# Patient Record
Sex: FEMALE | Race: WHITE | Employment: OTHER | URBAN - METROPOLITAN AREA
[De-identification: names, ages, dates, MRNs, and addresses within clinical notes are randomized per-mention and may not be internally consistent; named-entity substitution may affect disease eponyms.]

---

## 2017-09-21 ENCOUNTER — APPOINTMENT (OUTPATIENT)
Dept: GENERAL RADIOLOGY | Age: 67
End: 2017-09-21
Attending: EMERGENCY MEDICINE
Payer: MEDICARE

## 2017-09-21 ENCOUNTER — HOSPITAL ENCOUNTER (EMERGENCY)
Age: 67
Discharge: HOME | End: 2017-09-21
Attending: EMERGENCY MEDICINE
Payer: MEDICARE

## 2017-09-21 VITALS
DIASTOLIC BLOOD PRESSURE: 74 MMHG | BODY MASS INDEX: 43.67 KG/M2 | WEIGHT: 246.47 LBS | OXYGEN SATURATION: 98 % | TEMPERATURE: 97.3 F | HEART RATE: 66 BPM | RESPIRATION RATE: 16 BRPM | HEIGHT: 63 IN | SYSTOLIC BLOOD PRESSURE: 157 MMHG

## 2017-09-21 DIAGNOSIS — M25.462 KNEE EFFUSION, LEFT: ICD-10-CM

## 2017-09-21 DIAGNOSIS — M25.562 ACUTE PAIN OF LEFT KNEE: Primary | ICD-10-CM

## 2017-09-21 DIAGNOSIS — M17.12 ARTHRITIS OF KNEE, LEFT: ICD-10-CM

## 2017-09-21 PROCEDURE — 74011250637 HC RX REV CODE- 250/637: Performed by: PHYSICIAN ASSISTANT

## 2017-09-21 PROCEDURE — 99283 EMERGENCY DEPT VISIT LOW MDM: CPT

## 2017-09-21 PROCEDURE — L1830 KO IMMOB CANVAS LONG PRE OTS: HCPCS

## 2017-09-21 PROCEDURE — 73562 X-RAY EXAM OF KNEE 3: CPT

## 2017-09-21 RX ORDER — MELOXICAM 15 MG/1
15 TABLET ORAL DAILY
Qty: 30 TAB | Refills: 0 | Status: SHIPPED | OUTPATIENT
Start: 2017-09-21

## 2017-09-21 RX ORDER — HYDROCODONE BITARTRATE AND ACETAMINOPHEN 5; 325 MG/1; MG/1
1 TABLET ORAL
Status: COMPLETED | OUTPATIENT
Start: 2017-09-21 | End: 2017-09-21

## 2017-09-21 RX ORDER — MELOXICAM 15 MG/1
15 TABLET ORAL DAILY
Qty: 30 TAB | Refills: 0 | Status: SHIPPED | OUTPATIENT
Start: 2017-09-21 | End: 2017-09-21

## 2017-09-21 RX ORDER — HYDROCODONE BITARTRATE AND ACETAMINOPHEN 5; 325 MG/1; MG/1
.5-1 TABLET ORAL
Qty: 20 TAB | Refills: 0 | Status: SHIPPED | OUTPATIENT
Start: 2017-09-21

## 2017-09-21 RX ADMIN — HYDROCODONE BITARTRATE AND ACETAMINOPHEN 1 TABLET: 5; 325 TABLET ORAL at 17:48

## 2017-09-21 NOTE — ED NOTES
Patient presents to the ED with spouse for c/o left knee pain since yesterday when she twisted state. Pain is currently a 3/10 at rest and is severe when she moves it or bears weight. No swelling or redness noted to the joint.   Per , she has been unable to walk on it today

## 2017-09-21 NOTE — DISCHARGE INSTRUCTIONS
Knee Arthritis: Care Instructions  Your Care Instructions  Knee arthritis is a breakdown of the cartilage that cushions your knee joint. When the cartilage wears down, your bones rub against each other. This causes pain and stiffness. Knee arthritis tends to get worse with time. Treatment for knee arthritis involves reducing pain, making the leg muscles stronger, and staying at a healthy body weight. The treatment usually does not improve the health of the cartilage, but it can reduce pain and improve how well your knee works. You can take simple measures to protect your knee joints, ease your pain, and help you stay active. Follow-up care is a key part of your treatment and safety. Be sure to make and go to all appointments, and call your doctor if you are having problems. It's also a good idea to know your test results and keep a list of the medicines you take. How can you care for yourself at home? · Know that knee arthritis will cause more pain on some days than on others. · Stay at a healthy weight. Lose weight if you are overweight. When you stand up, the pressure on your knees from every pound of body weight is multiplied four times. So if you lose 10 pounds, you will reduce the pressure on your knees by 40 pounds. · Talk to your doctor or physical therapist about exercises that will help ease joint pain. ¨ Stretch to help prevent stiffness and to prevent injury before you exercise. You may enjoy gentle forms of yoga to help keep your knee joints and muscles flexible. ¨ Walk instead of jog. ¨ Ride a bike. This makes your thigh muscles stronger and takes pressure off your knee. ¨ Wear well-fitting and comfortable shoes. ¨ Exercise in chest-deep water. This can help you exercise longer with less pain. ¨ Avoid exercises that include squatting or kneeling. They can put a lot of strain on your knees.   ¨ Talk to your doctor to make sure that the exercise you do is not making the arthritis worse.  · Do not sit for long periods of time. Try to walk once in a while to keep your knee from getting stiff. · Ask your doctor or physical therapist whether shoe inserts may reduce your arthritis pain. · If you can afford it, get new athletic shoes at least every year. This can help reduce the strain on your knees. · Use a device to help you do everyday activities. ¨ A cane or walking stick can help you keep your balance when you walk. Hold the cane or walking stick in the hand opposite the painful knee. ¨ If you feel like you may fall when you walk, try using crutches or a front-wheeled walker. These can prevent falls that could cause more damage to your knee. ¨ A knee brace may help keep your knee stable and prevent pain. ¨ You also can use other things to make life easier, such as a higher toilet seat and handrails in the bathtub or shower. · Take pain medicines exactly as directed. ¨ Do not wait until you are in severe pain. You will get better results if you take it sooner. ¨ If you are not taking a prescription pain medicine, take an over-the-counter medicine such as acetaminophen (Tylenol), ibuprofen (Advil, Motrin), or naproxen (Aleve). Read and follow all instructions on the label. ¨ Do not take two or more pain medicines at the same time unless the doctor told you to. Many pain medicines have acetaminophen, which is Tylenol. Too much acetaminophen (Tylenol) can be harmful. ¨ Tell your doctor if you take a blood thinner, have diabetes, or have allergies to shellfish. · Ask your doctor if you might benefit from a shot of steroid medicine into your knee. This may provide pain relief for several months. · Many people take the supplements glucosamine and chondroitin for osteoarthritis. Some people feel they help, but the medical research does not show that they work. Talk to your doctor before you take these supplements. When should you call for help?   Call your doctor now or seek immediate medical care if:  · You have sudden swelling, warmth, or pain in your knee. · You have knee pain and a fever or rash. · You have such bad pain that you cannot use your knee. Watch closely for changes in your health, and be sure to contact your doctor if you have any problems. Where can you learn more? Go to http://rosales-ofelia.info/. Enter H511 in the search box to learn more about \"Knee Arthritis: Care Instructions. \"  Current as of: October 31, 2016  Content Version: 11.3  © 8805-2406 [employer]. Care instructions adapted under license by [employer] (which disclaims liability or warranty for this information). If you have questions about a medical condition or this instruction, always ask your healthcare professional. [address] any warranty or liability for your use of this information. Knee Arthritis: Exercises  Your Care Instructions  Here are some examples of exercises for knee arthritis. Start each exercise slowly. Ease off the exercise if you start to have pain. Your doctor or physical therapist will tell you when you can start these exercises and which ones will work best for you. How to do the exercises  Knee flexion with heel slide    1. Lie on your back with your knees bent. 2. Slide your heel back by bending your affected knee as far as you can. Then hook your other foot around your ankle to help pull your heel even farther back. 3. Hold for about 6 seconds, then rest for up to 10 seconds. 4. Repeat 8 to 12 times. 5. Switch legs and repeat steps 1 through 4, even if only one knee is sore. Quad sets    1. Sit with your affected leg straight and supported on the floor or a firm bed. Place a small, rolled-up towel under your knee. Your other leg should be bent, with that foot flat on the floor. 2. Tighten the thigh muscles of your affected leg by pressing the back of your knee down into the towel.   3. Hold for about 6 seconds, then rest for up to 10 seconds. 4. Repeat 8 to 12 times. 5. Switch legs and repeat steps 1 through 4, even if only one knee is sore. Straight-leg raises to the front    1. Lie on your back with your good knee bent so that your foot rests flat on the floor. Your affected leg should be straight. Make sure that your low back has a normal curve. You should be able to slip your hand in between the floor and the small of your back, with your palm touching the floor and your back touching the back of your hand. 2. Tighten the thigh muscles in your affected leg by pressing the back of your knee flat down to the floor. Hold your knee straight. 3. Keeping the thigh muscles tight and your leg straight, lift your affected leg up so that your heel is about 12 inches off the floor. Hold for about 6 seconds, then lower slowly. 4. Relax for up to 10 seconds between repetitions. 5. Repeat 8 to 12 times. 6. Switch legs and repeat steps 1 through 5, even if only one knee is sore. Active knee flexion    1. Lie on your stomach with your knees straight. If your kneecap is uncomfortable, roll up a washcloth and put it under your leg just above your kneecap. 2. Lift the foot of your affected leg by bending the knee so that you bring the foot up toward your buttock. If this motion hurts, try it without bending your knee quite as far. This may help you avoid any painful motion. 3. Slowly move your leg up and down. 4. Repeat 8 to 12 times. 5. Switch legs and repeat steps 1 through 4, even if only one knee is sore. Quadriceps stretch (facedown)    1. Lie flat on your stomach, and rest your face on the floor. 2. Wrap a towel or belt strap around the lower part of your affected leg. Then use the towel or belt strap to slowly pull your heel toward your buttock until you feel a stretch. 3. Hold for about 15 to 30 seconds, then relax your leg against the towel or belt strap. 4. Repeat 2 to 4 times.   5. Switch legs and repeat steps 1 through 4, even if only one knee is sore. Stationary exercise bike    If you do not have a stationary exercise bike at home, you can find one to ride at your local health club or community center. 1. Adjust the height of the bike seat so that your knee is slightly bent when your leg is extended downward. If your knee hurts when the pedal reaches the top, you can raise the seat so that your knee does not bend as much. 2. Start slowly. At first, try to do 5 to 10 minutes of cycling with little to no resistance. Then increase your time and the resistance bit by bit until you can do 20 to 30 minutes without pain. 3. If you start to have pain, rest your knee until your pain gets back to the level that is normal for you. Or cycle for less time or with less effort. Follow-up care is a key part of your treatment and safety. Be sure to make and go to all appointments, and call your doctor if you are having problems. It's also a good idea to know your test results and keep a list of the medicines you take. Where can you learn more? Go to http://rosalesCompuMedofelia.info/. Enter C159 in the search box to learn more about \"Knee Arthritis: Exercises. \"  Current as of: March 21, 2017  Content Version: 11.3  © 0496-2874 [employer]. Care instructions adapted under license by [employer] (which disclaims liability or warranty for this information). If you have questions about a medical condition or this instruction, always ask your healthcare professional. [place] [address] any warranty or liability for your use of this information. Knee Pain or Injury: Care Instructions  Your Care Instructions    Injuries are a common cause of knee problems. Sudden (acute) injuries may be caused by a direct blow to the knee. They can also be caused by abnormal twisting, bending, or falling on the knee.  Pain, bruising, or swelling may be severe, and may start within minutes of the injury. Overuse is another cause of knee pain. Other causes are climbing stairs, kneeling, and other activities that use the knee. Everyday wear and tear, especially as you get older, also can cause knee pain. Rest, along with home treatment, often relieves pain and allows your knee to heal. If you have a serious knee injury, you may need tests and treatment. Follow-up care is a key part of your treatment and safety. Be sure to make and go to all appointments, and call your doctor if you are having problems. It's also a good idea to know your test results and keep a list of the medicines you take. How can you care for yourself at home? · Be safe with medicines. Read and follow all instructions on the label. ¨ If the doctor gave you a prescription medicine for pain, take it as prescribed. ¨ If you are not taking a prescription pain medicine, ask your doctor if you can take an over-the-counter medicine. · Rest and protect your knee. Take a break from any activity that may cause pain. · Put ice or a cold pack on your knee for 10 to 20 minutes at a time. Put a thin cloth between the ice and your skin. · Prop up a sore knee on a pillow when you ice it or anytime you sit or lie down for the next 3 days. Try to keep it above the level of your heart. This will help reduce swelling. · If your knee is not swollen, you can put moist heat, a heating pad, or a warm cloth on your knee. · If your doctor recommends an elastic bandage, sleeve, or other type of support for your knee, wear it as directed. · Follow your doctor's instructions about how much weight you can put on your leg. Use a cane, crutches, or a walker as instructed. · Follow your doctor's instructions about activity during your healing process. If you can do mild exercise, slowly increase your activity. · Reach and stay at a healthy weight. Extra weight can strain the joints, especially the knees and hips, and make the pain worse. Losing even a few pounds may help. When should you call for help? Call 911 anytime you think you may need emergency care. For example, call if:  · You have symptoms of a blood clot in your lung (called a pulmonary embolism). These may include:  ¨ Sudden chest pain. ¨ Trouble breathing. ¨ Coughing up blood. Call your doctor now or seek immediate medical care if:  · You have severe or increasing pain. · Your leg or foot turns cold or changes color. · You cannot stand or put weight on your knee. · Your knee looks twisted or bent out of shape. · You cannot move your knee. · You have signs of infection, such as:  ¨ Increased pain, swelling, warmth, or redness. ¨ Red streaks leading from the knee. ¨ Pus draining from a place on your knee. ¨ A fever. · You have signs of a blood clot in your leg (called a deep vein thrombosis), such as:  ¨ Pain in your calf, back of the knee, thigh, or groin. ¨ Redness and swelling in your leg or groin. Watch closely for changes in your health, and be sure to contact your doctor if:  · You have tingling, weakness, or numbness in your knee. · You have any new symptoms, such as swelling. · You have bruises from a knee injury that last longer than 2 weeks. · You do not get better as expected. Where can you learn more? Go to http://rosales-ofelia.info/. Enter K195 in the search box to learn more about \"Knee Pain or Injury: Care Instructions. \"  Current as of: March 20, 2017  Content Version: 11.3  © 4267-5665 [employer]. Care instructions adapted under license by [employer] (which disclaims liability or warranty for this information). If you have questions about a medical condition or this instruction, always ask your healthcare professional. [address] any warranty or liability for your use of this information.

## 2017-09-21 NOTE — ED PROVIDER NOTES
HPI Comments: Myrtle Kwok is a 73 y.o. female with PMhx significant for DM, HTN, hyperlipidemia, afib, depression, and obesity who presents ambulatory to the ED with cc of constant left knee pain which started today. Pt explains that the pain is sometimes sharp and sometimes achy. Her knee pain is located on the inside of her left knee. She describes that she hurt it [place] while ago\" and she twisted it today. Pt had a surgery on her left meniscus in 2009 and was going back to the surgeon today when she injured her knee. She explains that \"something snapped in it\". Pt notes that her orthopedic surgeon would not do another surgery on her after her injury. She has tried resting with no relief. Pt is otherwise without complaint. Social Hx: - Tobacco, - EtOH, - Illicit Drugs    PCP: Claudia Rosen MD    There are no other complaints, changes or physical findings at this time. The history is provided by the patient. No  was used. Past Medical History:   Diagnosis Date    Atrial fibrillation ([address]     Colon polyp     Depression     Diabetes ([address]     Neuropathy    Hyperlipemia     Hypertension     Hypothyroidism     Morbid obesity with BMI of 40.0-44.9, adult (HCC)        Past Surgical History:   Procedure Laterality Date    ABDOMEN SURGERY PROC UNLISTED      HX APPENDECTOMY      HX GYN      Hysterectomy, BTL    HX HEENT      nasal  surgery    HX ORTHOPAEDIC      neck surgery, foot surgery, knee and shoulder surgery         Family History:   Problem Relation Age of Onset    Heart Disease Mother        Social History     Social History    Marital status:      Spouse name: N/A    Number of children: N/A    Years of education: N/A     Occupational History    Not on file.      Social History Main Topics    Smoking status: Never Smoker    Smokeless tobacco: Never Used    Alcohol use No    Drug use: No    Sexual activity: Not on file     Other Topics Concern    Not on file     Social History Narrative         ALLERGIES: Tetanus vaccines and toxoid    Review of Systems   Constitutional: Negative. Negative for fever. HENT: Negative. Eyes: Negative. Respiratory: Negative. Cardiovascular: Negative. Gastrointestinal: Negative. Negative for constipation, diarrhea, nausea and vomiting. Denies liver disease   Endocrine:        Denies thyroid disease   Genitourinary: Negative. Negative for dysuria. Denies kidney disease   Musculoskeletal: Positive for arthralgias (left knee). Skin: Negative. Neurological: Negative. All other systems reviewed and are negative. Vitals:    09/21/17 1547   BP: 157/74   Pulse: 66   Resp: 16   Temp: 97.3 °F (36.3 °C)   SpO2: 98%   Weight: 111.8 kg (246 lb 7.6 oz)   Height: 5' 3\" (1.6 m)            Physical Exam   Constitutional: She is oriented to person, place, and time. She appears well-developed and well-nourished. No distress. Patient is obese. Eyes: Conjunctivae and EOM are normal. Pupils are equal, round, and reactive to light. Right eye exhibits no discharge. Left eye exhibits no discharge. No scleral icterus. Neck: Normal range of motion. Neck supple. No tracheal deviation present. Cardiovascular: Normal rate, regular rhythm, normal heart sounds and intact distal pulses. Exam reveals no gallop and no friction rub. No murmur heard. Pulmonary/Chest: Effort normal and breath sounds normal. No respiratory distress. She has no wheezes. She has no rales. She exhibits no tenderness. Musculoskeletal: She exhibits no edema. Full range of motion of left knee. Tenderness to medial joint line of left knee with no contusions or erythema. No bony tenderness to left hip. Patient is ambulatory with a limp. Lymphadenopathy:     She has no cervical adenopathy. Neurological: She is alert and oriented to person, place, and time. No cranial nerve deficit.    Neurovascularly intact distally. Skin: Skin is warm and dry. No rash noted. No erythema. Well healed laparoscopic scar on left knee. Psychiatric: She has a normal mood and affect. Her behavior is normal.   Nursing note and vitals reviewed. MDM  Number of Diagnoses or Management Options  Diagnosis management comments:   DDx: knee injury, sprain, strain, meniscus tear, arthritis       Amount and/or Complexity of Data Reviewed  Tests in the radiology section of CPT®: ordered and reviewed  Review and summarize past medical records: yes    Patient Progress  Patient progress: stable    ED Course       Procedures    Procedure Note - Splint Placement:  5:35 PM  Performed by: Viviane Hammonds  Neurovascularly intact prior to tx. A knee immobilizer was placed on pt's left knee. Joint was placed in neutral position. Neurovascularly intact after tx. She was given crutches and follow up instructions with and orthopedist.  The procedure took 1-15 minutes, and pt tolerated well. Written by Vicente Hagan, ED Scribe, as dictated by Viviane Hammonds. IMAGING RESULTS:  XR KNEE LT 3 V   Final Result   EXAM:  XR KNEE LT 3 V     INDICATION:   Knee pain.     COMPARISON: 11/25/2009.     FINDINGS: Three views of the left knee demonstrate no fracture or other acute  osseous or articular abnormality. There is a joint effusion. There is moderate  tricompartmental DJD.     IMPRESSION  IMPRESSION:  No acute abnormality. MEDICATIONS GIVEN:  Medications   HYDROcodone-acetaminophen (NORCO) 5-325 mg per tablet 1 Tab (not administered)       IMPRESSION:  1. Acute pain of left knee    2. Knee effusion, left    3. Arthritis of knee, left        PLAN:  1. Current Discharge Medication List      START taking these medications    Details   HYDROcodone-acetaminophen (NORCO) 5-325 mg per tablet Take 0.5-1 Tabs by mouth every six (6) hours as needed for Pain. Max Daily Amount: 4 Tabs.  Take in addition to Meloxicam for severe pain flares  Qty: 20 Tab, Refills: 0      meloxicam (MOBIC) 15 mg tablet Take 1 Tab by mouth daily. Take with first meal of the day. Indications: OSTEOARTHRITIS  Qty: 30 Tab, Refills: 0           2. Follow-up Information     Follow up With Details Comments MD Mikhail Loyd [address]      Erin Mention, DO Schedule an appointment as soon as possible for a visit Orthopedic specialist [facility] [phone]      [facility] EMERGENCY DEPT  If symptoms worsen [address]  [address] [facility]  [phone]        Return to ED if worse     DISCHARGE NOTE  5:35 PM  The patient has been re-evaluated and is ready for discharge. Reviewed available results with patient. Counseled patient on diagnosis and care plan. Patient has expressed understanding, and all questions have been answered. Patient agrees with plan and agrees to follow up as recommended, or return to the ED if their symptoms worsen. Discharge instructions have been provided and explained to the patient, along with reasons to return to the ED. Attestation Note:  This note is prepared by MIGUEL A [facility], acting as Scribe for Pancho Randolph: The scribe's documentation has been prepared under my direction and personally reviewed by me in its entirety. I confirm that the note above accurately reflects all work, treatment, procedures, and medical decision making performed by me.

## 2017-09-21 NOTE — ED NOTES
All discharge instructions reviewed with Ashli DYER, patient, and spouse. They deny any further questions regarding these instructions and ambulate out of ED in crutches.

## 2019-08-23 ENCOUNTER — OFFICE VISIT (OUTPATIENT)
Dept: NEUROLOGY | Age: 69
End: 2019-08-23

## 2019-08-23 VITALS
WEIGHT: 211 LBS | RESPIRATION RATE: 18 BRPM | BODY MASS INDEX: 37.38 KG/M2 | SYSTOLIC BLOOD PRESSURE: 132 MMHG | OXYGEN SATURATION: 98 % | HEART RATE: 50 BPM | DIASTOLIC BLOOD PRESSURE: 88 MMHG

## 2019-08-23 DIAGNOSIS — G30.0 EARLY ONSET ALZHEIMER'S DEMENTIA WITHOUT BEHAVIORAL DISTURBANCE (HCC): Primary | ICD-10-CM

## 2019-08-23 DIAGNOSIS — F02.80 EARLY ONSET ALZHEIMER'S DEMENTIA WITHOUT BEHAVIORAL DISTURBANCE (HCC): Primary | ICD-10-CM

## 2019-08-23 RX ORDER — MEMANTINE HYDROCHLORIDE 10 MG/1
TABLET ORAL 2 TIMES DAILY
COMMUNITY

## 2019-08-23 NOTE — PATIENT INSTRUCTIONS
[facility]   Statement to Patients  April 1, 2014      In an effort to ensure the large volume of patient prescription refills is processed in the most efficient and expeditious manner, we are asking our patients to assist us by calling your Pharmacy for all prescription refills, this will include also your  Mail Order Pharmacy. The pharmacy will contact our office electronically to continue the refill process. Please do not wait until the last minute to call your pharmacy. We need at least 48 hours (2days) to fill prescriptions. We also encourage you to call your pharmacy before going to  your prescription to make sure it is ready. With regard to controlled substance prescription refill requests (narcotic refills) that need to be picked up at our office, we ask your cooperation by providing us with at least 72 hours (3days) notice that you will need a refill. We will not refill narcotic prescription refill requests after 4:00pm on any weekday, Monday through Thursday, or after 2:00pm on Fridays, or on the weekends. We encourage everyone to explore another way of getting your prescription refill request processed using hoozin, our patient web portal through our electronic medical record system. hoozin is an efficient and effective way to communicate your medication request directly to the office and  downloadable as an susie on your smart phone . hoozin also features a review functionality that allows you to view your medication list as well as leave messages for your physician. Are you ready to get connected? If so please review the attatched instructions or speak to any of our staff to get you set up right away! Thank you so much for your cooperation. Should you have any questions please contact our Practice Administrator.     The Physicians and Staff,  [facility]

## 2019-08-23 NOTE — PROGRESS NOTES
Chief Complaint   Patient presents with    Neurologic Problem         HISTORY OF PRESENT ILLNESS  Sia Lazar is a 80 y.o. female who came in along with her , son and daughter to discuss her memory problems which she has been having for the past 6 or 7 years. It seems to be progressively getting worse. She now has difficulty doing basic activities of daily living. She has put in metallic utensils in the microwave, has gotten lost while driving, is unable to keep track of or handle money. Has very little meaningful conversations. Goes to bed at about 9 PM and wakes up around 1 PM the next day and soon after she wakes up, she wishes to go to [employer]. Needs a supervision with bathing dressing etc.  She has not been compliant with her medications and does not take them consistently. She and her  are living in an independent apartment complex.  is also dealing with his own health issues including advanced arthritis in the knees, morbid obesity       Past Medical History:   Diagnosis Date    Atrial fibrillation ([facility] [address]     Colon polyp     Depression     Diabetes ([facility]     Neuropathy    Hyperlipemia     Hypertension     Hypothyroidism     Morbid obesity with BMI of 40.0-44.9, adult (HCC)      Current Outpatient Medications   Medication Sig    memantine (NAMENDA) 10 mg tablet Take  by mouth two (2) times a day.  aspirin 81 mg chewable tablet Take 162 mg by mouth daily.  HYDROcodone-acetaminophen (NORCO) 5-325 mg per tablet Take 0.5-1 Tabs by mouth every six (6) hours as needed for Pain. Max Daily Amount: 4 Tabs. Take in addition to Meloxicam for severe pain flares    meloxicam (MOBIC) 15 mg tablet Take 1 Tab by mouth daily. Take with first meal of the day. Indications: OSTEOARTHRITIS    ergocalciferol (VITAMIN D2) 50,000 unit capsule Take 50,000 Units by mouth every seven (7) days.     diazepam (VALIUM) 5 mg tablet Take 1 tablet by mouth three (3) times daily as needed for Anxiety (spasm).  atorvastatin (LIPITOR) 40 mg tablet Take  by mouth daily.  metformin (GLUCOPHAGE) 500 mg tablet Take 1 Tab by mouth two (2) times daily (with meals). Take  by mouth two (2) times daily (with meals). Do not restart for 2 days    verapamil (CALAN) 120 mg tablet Take 1 Tab by mouth daily.  levothyroxine (SYNTHROID) 50 mcg tablet Take  by mouth daily (before breakfast). No current facility-administered medications for this visit. Allergies   Allergen Reactions    Tetanus Vaccines And Toxoid Unknown (comments)     Family History   Problem Relation Age of Onset    Heart Disease Mother      Social History     Tobacco Use    Smoking status: Never Smoker    Smokeless tobacco: Never Used   Substance Use Topics    Alcohol use: No    Drug use: No     Past Surgical History:   Procedure Laterality Date    ABDOMEN SURGERY PROC UNLISTED      HX APPENDECTOMY      HX GYN      Hysterectomy, BTL    HX HEENT      nasal  surgery    HX ORTHOPAEDIC      neck surgery, foot surgery, knee and shoulder surgery         REVIEW OF SYSTEMS  Review of Systems - History obtained from the patient  Psychological ROS: negative  ENT ROS: negative  Hematological and Lymphatic ROS: negative  Endocrine ROS: negative  Respiratory ROS: no cough, shortness of breath, or wheezing  Cardiovascular ROS: no chest pain or dyspnea on exertion  Gastrointestinal ROS: no abdominal pain, change in bowel habits, or black or bloody stools  Genito-Urinary ROS: no dysuria, trouble voiding, or hematuria  Musculoskeletal ROS: negative  Dermatological ROS: negative      PHYSICAL EXAMINATION:    Visit Vitals  /88   Pulse (!) 50   Resp 18   Wt 95.7 kg (211 lb)   SpO2 98%   BMI 37.38 kg/m²     General:  Well defined, nourished, and groomed individual in no acute distress. Neck: Supple, nontender, no bruits, no pain with resistance to active range of motion. Heart: Regular rate and rhythm, no murmurs, rub, or gallop. Normal S1S2. Lungs:  Clear to auscultation bilaterally with equal chest expansion, no cough, no wheeze  Musculoskeletal:  Extremities revealed no edema and had full range of motion of joints. Psych:  Good mood and bright affect    NEUROLOGICAL EXAMINATION:     Mental Status:   Oriented to self and family only. Could not tell me today's date, day or month. She had significant difficulty with [facility] cognitive assessment. Could not register 3 objects in her memory. Cranial Nerves:    II, III, IV, VI:  Visual acuity grossly intact. Visual fields are normal.    Pupils are equal, round, and reactive to light and accommodation. Extra-ocular movements are full and fluid. V-XII: Hearing is grossly intact. Facial features are symmetric, with normal sensation and strength. The palate rises symmetrically and the tongue protrudes midline. Sternocleidomastoids 5/5. Motor Examination: Normal tone, bulk, and strength. 5/5 muscle strength throughout. No cogwheel rigidity or clonus present. Sensory exam:  Normal throughout to pinprick, temperature, and vibration sense. Normal proprioception. Coordination:  Finger to nose and rapid arm movement testing was normal.   No resting or intention tremor    Gait and Station:  Steady. Normal arm swing. No Rhomberg or pronator drift. No muscle wasting or fasiculations noted. Reflexes:  DTRs 2+ throughout. Toes downgoing.         LABS / IMAGING  Lab Results   Component Value Date/Time    WBC 6.1 04/01/2015 09:34 AM    HGB 12.5 04/01/2015 09:34 AM    HCT 39.3 04/01/2015 09:34 AM    PLATELET 032 49/59/1746 09:34 AM    MCV 91.6 04/01/2015 09:34 AM     Lab Results   Component Value Date/Time    Sodium 140 04/01/2015 03:40 AM    Potassium 3.4 (L) 04/01/2015 03:40 AM    Chloride 105 04/01/2015 03:40 AM    CO2 26 04/01/2015 03:40 AM    Anion gap 9 04/01/2015 03:40 AM    Glucose 135 (H) 04/01/2015 03:40 AM    BUN 5 (L) 04/01/2015 03:40 AM    Creatinine 0.57 04/01/2015 03:40 AM    BUN/Creatinine ratio 9 (L) 04/01/2015 03:40 AM    GFR est AA >60 04/01/2015 03:40 AM    GFR est non-AA >60 04/01/2015 03:40 AM    Calcium 9.1 04/01/2015 03:40 AM    Bilirubin, total 0.4 04/01/2015 03:40 AM    AST (SGOT) 26 04/01/2015 03:40 AM    Alk. phosphatase 60 04/01/2015 03:40 AM    Protein, total 6.5 04/01/2015 03:40 AM    Albumin 3.2 (L) 04/01/2015 03:40 AM    Globulin 3.3 04/01/2015 03:40 AM    A-G Ratio 1.0 (L) 04/01/2015 03:40 AM    ALT (SGPT) 22 04/01/2015 03:40 AM     Lab Results   Component Value Date/Time    TSH 2.04 03/28/2015 02:00 PM     Lab Results   Component Value Date/Time    Vitamin B12 415 03/28/2015 02:00 PM       ASSESSMENT    ICD-10-CM ICD-9-CM    1. Early onset Alzheimer's dementia without behavioral disturbance G30.0 331.0 MRI BRAIN WO CONT    F02.80 294.10        DISCUSSION  Ms. Antonia Anderson has advanced dementia of Alzheimer's type, likely due to Alzheimer's disease. We discussed that she is incapable of being alone for any amount of time. Fortunately she has a very supportive  and she is under supervision 24/7.   She should not drive  We also discussed that her needs are likely going to increase further with time and it is better for them to move into an assisted living facility as  has his own medical issues  Will arrange for a appointment with Alzheimer's counselor  We discussed pharmacologic treatment options but I am deferring those now as compliance with medications has been a big issue  We will check MRI scan of the brain to rule out any other structural causes and to assess for extent of white matter disease  Different methods to keep her engaged mentally were reviewed but it appears difficult with her current living situation  We will continue to follow clinically    Raz Ahn MD  Diplomate, [address] [place] [address] Rd., [address] of Psychiatry & Neurology (Neurology)  Diplomate, [address] [place] [address] Rd., [address] of Psychiatry & Neurology (Clinical Neurophysiology)  Diplomate, American Board of Electrodiagnostic Medicine    This note will not be viewable in [address]

## 2019-08-26 ENCOUNTER — TELEPHONE (OUTPATIENT)
Dept: NEUROLOGY | Age: 69
End: 2019-08-26

## 2019-08-26 NOTE — TELEPHONE ENCOUNTER
Betty w/ [facility] scheduling talked to pt's  about schedule MRI and he said Dr. Ruth Campbell said the pt could have it done in [facility]. She is calling to get clarification. Steph Hardy stated the pt's  said it was not [place].  Please call back

## 2019-08-27 NOTE — TELEPHONE ENCOUNTER
Spoke with , he states he doesn't mind driving to [place] to have imaging done. It's not that far. Left message for [facility] with scheduling.

## 2019-08-28 ENCOUNTER — TELEPHONE (OUTPATIENT)
Dept: NEUROLOGY | Age: 69
End: 2019-08-28

## 2019-08-28 NOTE — TELEPHONE ENCOUNTER
Patient has no heard anything about scheduling MRI. Patient would like a call back from nurse. Please advise.       Best # [phone]

## 2019-08-30 ENCOUNTER — TELEPHONE (OUTPATIENT)
Dept: NEUROLOGY | Age: 69
End: 2019-08-30

## 2019-08-30 NOTE — TELEPHONE ENCOUNTER
----- Message from Peterson Rand sent at 8/30/2019  1:28 PM EDT -----  Regarding: Dr. Jayashree Redding first and last name: Kyaw Deng      Reason for call: requesting a call back in regards to test results      Callback required yes/no and why: yes      Best contact number(s): [phone]      Details to clarify the request:       Peterson Rand

## 2020-01-10 ENCOUNTER — OFFICE VISIT (OUTPATIENT)
Dept: NEUROLOGY | Age: 70
End: 2020-01-10

## 2020-01-10 VITALS
DIASTOLIC BLOOD PRESSURE: 72 MMHG | OXYGEN SATURATION: 98 % | SYSTOLIC BLOOD PRESSURE: 140 MMHG | HEART RATE: 64 BPM | HEIGHT: 63 IN | WEIGHT: 231.4 LBS | BODY MASS INDEX: 41 KG/M2 | RESPIRATION RATE: 16 BRPM

## 2020-01-10 DIAGNOSIS — G30.0 EARLY ONSET ALZHEIMER'S DEMENTIA WITHOUT BEHAVIORAL DISTURBANCE (HCC): Primary | ICD-10-CM

## 2020-01-10 DIAGNOSIS — F02.80 EARLY ONSET ALZHEIMER'S DEMENTIA WITHOUT BEHAVIORAL DISTURBANCE (HCC): Primary | ICD-10-CM

## 2020-01-10 RX ORDER — ROSUVASTATIN CALCIUM 20 MG/1
20 TABLET, COATED ORAL
COMMUNITY

## 2020-01-10 RX ORDER — ESCITALOPRAM OXALATE 10 MG/1
10 TABLET ORAL DAILY
COMMUNITY

## 2020-01-10 RX ORDER — LORAZEPAM 1 MG/1
TABLET ORAL
COMMUNITY
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
## 2020-01-10 NOTE — PATIENT INSTRUCTIONS
[facility]   Statement to Patients  April 1, 2014      In an effort to ensure the large volume of patient prescription refills is processed in the most efficient and expeditious manner, we are asking our patients to assist us by calling your Pharmacy for all prescription refills, this will include also your  Mail Order Pharmacy. The pharmacy will contact our office electronically to continue the refill process. Please do not wait until the last minute to call your pharmacy. We need at least 48 hours (2days) to fill prescriptions. We also encourage you to call your pharmacy before going to  your prescription to make sure it is ready. With regard to controlled substance prescription refill requests (narcotic refills) that need to be picked up at our office, we ask your cooperation by providing us with at least 72 hours (3days) notice that you will need a refill. We will not refill narcotic prescription refill requests after 4:00pm on any weekday, Monday through Thursday, or after 2:00pm on Fridays, or on the weekends. We encourage everyone to explore another way of getting your prescription refill request processed using [employer], our patient web portal through our electronic medical record system. [employer] is an efficient and effective way to communicate your medication request directly to the office and  downloadable as an susie on your smart phone . [employer] also features a review functionality that allows you to view your medication list as well as leave messages for your physician. Are you ready to get connected? If so please review the attatched instructions or speak to any of our staff to get you set up right away! Thank you so much for your cooperation. Should you have any questions please contact our Practice Administrator.     The Physicians and Staff,  [facility]

## 2020-06-08 ENCOUNTER — HOSPITAL ENCOUNTER (OUTPATIENT)
Dept: REHABILITATION | Age: 70
End: 2020-06-23
Attending: PHYSICAL MEDICINE & REHABILITATION | Admitting: PHYSICAL MEDICINE & REHABILITATION

## 2020-06-08 DIAGNOSIS — S06.9X0A UNSPECIFIED INTRACRANIAL INJURY WITHOUT LOSS OF CONSCIOUSNESS, INITIAL ENCOUNTER (HCC): ICD-10-CM

## 2020-06-09 LAB
25(OH)D3 SERPL-MCNC: 36.3 NG/ML (ref 30–100)
ALBUMIN SERPL-MCNC: 3.7 G/DL (ref 3.5–5)
ALBUMIN/GLOB SERPL: 0.9 {RATIO} (ref 1.1–2.2)
ALP SERPL-CCNC: 113 U/L (ref 45–117)
ALT SERPL-CCNC: 16 U/L (ref 12–78)
ANION GAP SERPL CALC-SCNC: 9 MMOL/L (ref 5–15)
APPEARANCE UR: ABNORMAL
AST SERPL-CCNC: 17 U/L (ref 15–37)
BACTERIA URNS QL MICRO: ABNORMAL /HPF
BILIRUB SERPL-MCNC: 1.3 MG/DL (ref 0.2–1)
BILIRUB UR QL: NEGATIVE
BUN SERPL-MCNC: 12 MG/DL (ref 6–20)
BUN/CREAT SERPL: 20 (ref 12–20)
CALCIUM SERPL-MCNC: 9.7 MG/DL (ref 8.5–10.1)
CHLORIDE SERPL-SCNC: 102 MMOL/L (ref 97–108)
CO2 SERPL-SCNC: 24 MMOL/L (ref 21–32)
COLOR UR: ABNORMAL
CREAT SERPL-MCNC: 0.61 MG/DL (ref 0.55–1.02)
EPITH CASTS URNS QL MICRO: ABNORMAL /LPF
ERYTHROCYTE [DISTWIDTH] IN BLOOD BY AUTOMATED COUNT: 14.3 % (ref 11.5–14.5)
GLOBULIN SER CALC-MCNC: 4 G/DL (ref 2–4)
GLUCOSE SERPL-MCNC: 135 MG/DL (ref 65–100)
GLUCOSE UR STRIP.AUTO-MCNC: NEGATIVE MG/DL
HCT VFR BLD AUTO: 42.8 % (ref 35–47)
HGB BLD-MCNC: 13.8 G/DL (ref 11.5–16)
HGB UR QL STRIP: NEGATIVE
KETONES UR QL STRIP.AUTO: 15 MG/DL
LEUKOCYTE ESTERASE UR QL STRIP.AUTO: ABNORMAL
MAGNESIUM SERPL-MCNC: 2.3 MG/DL (ref 1.6–2.4)
MCH RBC QN AUTO: 31.3 PG (ref 26–34)
MCHC RBC AUTO-ENTMCNC: 32.2 G/DL (ref 30–36.5)
MCV RBC AUTO: 97.1 FL (ref 80–99)
MUCOUS THREADS URNS QL MICRO: ABNORMAL /LPF
NITRITE UR QL STRIP.AUTO: POSITIVE
NRBC # BLD: 0 K/UL (ref 0–0.01)
NRBC BLD-RTO: 0 PER 100 WBC
PH UR STRIP: 6 [PH] (ref 5–8)
PLATELET # BLD AUTO: 199 K/UL (ref 150–400)
PMV BLD AUTO: 10.5 FL (ref 8.9–12.9)
POTASSIUM SERPL-SCNC: 3.3 MMOL/L (ref 3.5–5.1)
PROT SERPL-MCNC: 7.7 G/DL (ref 6.4–8.2)
PROT UR STRIP-MCNC: ABNORMAL MG/DL
RBC # BLD AUTO: 4.41 M/UL (ref 3.8–5.2)
RBC #/AREA URNS HPF: ABNORMAL /HPF (ref 0–5)
SODIUM SERPL-SCNC: 135 MMOL/L (ref 136–145)
SP GR UR REFRACTOMETRY: 1.03 (ref 1–1.03)
UA: UC IF INDICATED,UAUC: ABNORMAL
UROBILINOGEN UR QL STRIP.AUTO: 1 EU/DL (ref 0.2–1)
WBC # BLD AUTO: 6.1 K/UL (ref 3.6–11)
WBC URNS QL MICRO: ABNORMAL /HPF (ref 0–4)

## 2020-06-09 PROCEDURE — 87077 CULTURE AEROBIC IDENTIFY: CPT

## 2020-06-09 PROCEDURE — 36415 COLL VENOUS BLD VENIPUNCTURE: CPT

## 2020-06-09 PROCEDURE — 82306 VITAMIN D 25 HYDROXY: CPT

## 2020-06-09 PROCEDURE — 85027 COMPLETE CBC AUTOMATED: CPT

## 2020-06-09 PROCEDURE — 80053 COMPREHEN METABOLIC PANEL: CPT

## 2020-06-09 PROCEDURE — 83735 ASSAY OF MAGNESIUM: CPT

## 2020-06-09 PROCEDURE — 87186 SC STD MICRODIL/AGAR DIL: CPT

## 2020-06-09 PROCEDURE — 81001 URINALYSIS AUTO W/SCOPE: CPT

## 2020-06-09 PROCEDURE — 87086 URINE CULTURE/COLONY COUNT: CPT

## 2020-06-11 LAB
BACTERIA SPEC CULT: ABNORMAL
CC UR VC: ABNORMAL
SERVICE CMNT-IMP: ABNORMAL

## 2020-06-14 LAB
ANION GAP SERPL CALC-SCNC: 5 MMOL/L (ref 5–15)
BUN SERPL-MCNC: 10 MG/DL (ref 6–20)
BUN/CREAT SERPL: 18 (ref 12–20)
CALCIUM SERPL-MCNC: 8.9 MG/DL (ref 8.5–10.1)
CHLORIDE SERPL-SCNC: 103 MMOL/L (ref 97–108)
CO2 SERPL-SCNC: 28 MMOL/L (ref 21–32)
CREAT SERPL-MCNC: 0.56 MG/DL (ref 0.55–1.02)
GLUCOSE SERPL-MCNC: 112 MG/DL (ref 65–100)
POTASSIUM SERPL-SCNC: 3.6 MMOL/L (ref 3.5–5.1)
SODIUM SERPL-SCNC: 136 MMOL/L (ref 136–145)

## 2020-06-14 PROCEDURE — 36415 COLL VENOUS BLD VENIPUNCTURE: CPT

## 2020-06-14 PROCEDURE — 80048 BASIC METABOLIC PNL TOTAL CA: CPT

## 2020-06-18 PROCEDURE — 87635 SARS-COV-2 COVID-19 AMP PRB: CPT

## 2020-06-19 LAB
SARS-COV-2, COV2: NOT DETECTED
SOURCE, COVRS: NORMAL
SPECIMEN SOURCE, FCOV2M: NORMAL

## 2020-06-21 LAB
ANION GAP SERPL CALC-SCNC: 5 MMOL/L (ref 5–15)
BUN SERPL-MCNC: 7 MG/DL (ref 6–20)
BUN/CREAT SERPL: 10 (ref 12–20)
CALCIUM SERPL-MCNC: 8.7 MG/DL (ref 8.5–10.1)
CHLORIDE SERPL-SCNC: 105 MMOL/L (ref 97–108)
CO2 SERPL-SCNC: 31 MMOL/L (ref 21–32)
CREAT SERPL-MCNC: 0.71 MG/DL (ref 0.55–1.02)
GLUCOSE SERPL-MCNC: 94 MG/DL (ref 65–100)
POTASSIUM SERPL-SCNC: 3.7 MMOL/L (ref 3.5–5.1)
SODIUM SERPL-SCNC: 141 MMOL/L (ref 136–145)

## 2020-06-21 PROCEDURE — 80048 BASIC METABOLIC PNL TOTAL CA: CPT

## 2020-06-21 PROCEDURE — 36415 COLL VENOUS BLD VENIPUNCTURE: CPT

## 2022-02-19 ENCOUNTER — HOSPITAL ENCOUNTER (EMERGENCY)
Age: 72
Discharge: HOME | End: 2022-02-19
Attending: EMERGENCY MEDICINE
Payer: MEDICARE

## 2022-02-19 ENCOUNTER — APPOINTMENT (OUTPATIENT)
Dept: CT IMAGING | Age: 72
End: 2022-02-19
Attending: EMERGENCY MEDICINE
Payer: MEDICARE

## 2022-02-19 VITALS
RESPIRATION RATE: 16 BRPM | SYSTOLIC BLOOD PRESSURE: 125 MMHG | HEIGHT: 63 IN | HEART RATE: 53 BPM | TEMPERATURE: 98.3 F | OXYGEN SATURATION: 97 % | WEIGHT: 230 LBS | BODY MASS INDEX: 40.75 KG/M2 | DIASTOLIC BLOOD PRESSURE: 82 MMHG
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
## 2022-02-19 DIAGNOSIS — S20.229A CONTUSION OF THORACIC SPINE: ICD-10-CM

## 2022-02-19 PROCEDURE — 72128 CT CHEST SPINE W/O DYE: CPT

## 2022-02-19 PROCEDURE — 74011250637 HC RX REV CODE- 250/637: Performed by: EMERGENCY MEDICINE

## 2022-02-19 PROCEDURE — 72125 CT NECK SPINE W/O DYE: CPT

## 2022-02-19 PROCEDURE — 99284 EMERGENCY DEPT VISIT MOD MDM: CPT

## 2022-02-19 PROCEDURE — 70450 CT HEAD/BRAIN W/O DYE: CPT

## 2022-02-19 RX ADMIN — ACETAMINOPHEN ORAL SOLUTION 1000 MG: 160 SOLUTION ORAL at 10:34

## 2022-02-19 NOTE — ED NOTES
Spoke with Pts. Son and daughter in law and they said pt. Will need transport back to facility due to [place] restrictions at [facility]. Daughter in laws number is [phone]. Nursing supervisor aware that pt. Needs transport back to facility.

## 2022-02-19 NOTE — ED PROVIDER NOTES
EMERGENCY DEPARTMENT HISTORY AND PHYSICAL EXAM      Date: 2/19/2022  Patient Name: Veena Son    History of Presenting Illness     Chief Complaint   Patient presents with    Fall       History Provided By: Patient, EMS and Nursing Home/SNF/Rehab Center    HPI: Veena Son, 80 y.o. female with PMHx significant for DM, hypertension, hypothyroid, presents via EMS to the ED with cc of fall. Patient is a resident at [facility]. Patient had a unwitnessed fall around 645 this morning. She was found by staff at 740. Hematoma was noted to her right forehead. Unknown loss of consciousness. Patient was complaining of some neck and mid back pain. Baseline mental status per facility. He denies any pain in her arms or legs. No chest pain or shortness of breath. No abdominal pain nausea vomiting or diarrhea. No vision changes. Is not on any anticoagulants other than daily baby aspirin. PMHx: Significant for DM, hypertension, hypothyroid  PSHx: Significant for appendectomy. Social Hx: Non-smoker. There are no other complaints, changes, or physical findings at this time. PCP: Gracie Almodovar MD    No current facility-administered medications on file prior to encounter. Current Outpatient Medications on File Prior to Encounter   Medication Sig Dispense Refill    escitalopram oxalate (LEXAPRO) 10 mg tablet Take 10 mg by mouth daily.  LORazepam (ATIVAN) 1 mg tablet Take  by mouth every four (4) hours as needed for Anxiety.  rosuvastatin (CRESTOR) 20 mg tablet Take 20 mg by mouth nightly.  memantine (NAMENDA) 10 mg tablet Take  by mouth two (2) times a day.  HYDROcodone-acetaminophen (NORCO) 5-325 mg per tablet Take 0.5-1 Tabs by mouth every six (6) hours as needed for Pain. Max Daily Amount: 4 Tabs. Take in addition to Meloxicam for severe pain flares 20 Tab 0    meloxicam (MOBIC) 15 mg tablet Take 1 Tab by mouth daily. Take with first meal of the day. Indications: OSTEOARTHRITIS 30 Tab 0    ergocalciferol (VITAMIN D2) 50,000 unit capsule Take 50,000 Units by mouth every seven (7) days.  diazepam (VALIUM) 5 mg tablet Take 1 tablet by mouth three (3) times daily as needed for Anxiety (spasm). 20 tablet 0    atorvastatin (LIPITOR) 40 mg tablet Take  by mouth daily.  metformin (GLUCOPHAGE) 500 mg tablet Take 1 Tab by mouth two (2) times daily (with meals). Take  by mouth two (2) times daily (with meals). Do not restart for 2 days 30 Tab 0    aspirin 81 mg chewable tablet Take 162 mg by mouth daily.  verapamil (CALAN) 120 mg tablet Take 1 Tab by mouth daily. 90 Tab 0    levothyroxine (SYNTHROID) 50 mcg tablet Take  by mouth daily (before breakfast). Past History     Past Medical History:  Past Medical History:   Diagnosis Date    Atrial fibrillation [facility] [address]     Colon polyp     Depression     Diabetes ([facility] [address]     Neuropathy    Hyperlipemia     Hypertension     Hypothyroidism     Morbid obesity with BMI of 40.0-44.9, adult ([facility])        Past Surgical History:  Past Surgical History:   Procedure Laterality Date    HX APPENDECTOMY      HX GYN      Hysterectomy, BTL    HX HEENT      nasal  surgery    HX ORTHOPAEDIC      neck surgery, foot surgery, knee and shoulder surgery    [place] ABDOMEN SURGERY PROC UNLISTED         Family History:  Family History   Problem Relation Age of Onset    Heart Disease Mother        Social History:  Social History     Tobacco Use    Smoking status: Never Smoker    Smokeless tobacco: Never Used   Substance Use Topics    Alcohol use: No    Drug use: No       Allergies: Allergies   Allergen Reactions    Tetanus Vaccines And Toxoid Unknown (comments)         Review of Systems   Review of Systems   Constitutional: Negative for activity change, chills and fever. HENT: Negative for congestion and sore throat. Eyes: Negative for pain and redness.    Respiratory: Negative for cough, chest tightness and shortness of breath. Cardiovascular: Negative for chest pain and palpitations. Gastrointestinal: Negative for abdominal pain, diarrhea, nausea and vomiting. Genitourinary: Negative for dysuria, frequency and urgency. Musculoskeletal: Positive for back pain and neck pain. Skin: Negative for rash. Neurological: Positive for headaches. Negative for syncope and light-headedness. Psychiatric/Behavioral: Negative for confusion. All other systems reviewed and are negative. Physical Exam   Physical Exam  Vitals and nursing note reviewed. Constitutional:       General: She is not in acute distress. Appearance: She is well-developed. She is not diaphoretic. HENT:      Head: Normocephalic. Comments: Small hematoma right forehead. Skin intact. Nose: Nose normal.      Mouth/Throat:      Pharynx: No oropharyngeal exudate. Eyes:      General: No scleral icterus. Conjunctiva/sclera: Conjunctivae normal.      Pupils: Pupils are equal, round, and reactive to light. Neck:      Thyroid: No thyromegaly. Vascular: No JVD. Trachea: No tracheal deviation. Comments: In c-collar PTA. No midline vertebral point tenderness. No step-offs. Cardiovascular:      Rate and Rhythm: Normal rate and regular rhythm. Heart sounds: No murmur heard. No friction rub. No gallop. Pulmonary:      Effort: Pulmonary effort is normal. No respiratory distress. Breath sounds: Normal breath sounds. No stridor. No wheezing or rales. Abdominal:      General: Bowel sounds are normal. There is no distension. Palpations: Abdomen is soft. Tenderness: There is no abdominal tenderness. There is no guarding or rebound. Musculoskeletal:         General: Normal range of motion. Cervical back: Normal range of motion and neck supple. Comments: He was all extremities with 5 out of 5 strength and no pain. Back: No upper or mid thoracic midline vertebral point tenderness. There is mild tenderness of her lower midline thoracic vertebral area. No step-offs. Skin intact. No midline lumbar vertebral point tenderness or step-offs. Toes up/down. Lymphadenopathy:      Cervical: No cervical adenopathy. Skin:     General: Skin is warm and dry. Findings: No erythema or rash. Neurological:      Mental Status: She is alert and oriented to person, place, and time. Cranial Nerves: No cranial nerve deficit. Motor: No abnormal muscle tone. Coordination: Coordination normal.   Psychiatric:         Behavior: Behavior normal.             Diagnostic Study Results     Labs -   No results found for this or any previous visit (from the past 12 hour(s)). Radiologic Studies -   CT HEAD WO CONT   Final Result   No acute intracranial abnormality. Right frontal scalp hematoma            CT SPINE [facility] WO CONT   Final Result   No evidence of acute fracture. CT SPINE [facility] WO CONT   Final Result      No evidence of fracture        CT Results  (Last 48 hours)               02/19/22 0909  CT HEAD WO CONT Final result    Impression:  No acute intracranial abnormality. Right frontal scalp hematoma               Narrative:  EXAM: CT HEAD WO CONT       INDICATION: fall       COMPARISON: None. CONTRAST: None. TECHNIQUE: Unenhanced CT of the head was performed using 5 mm images. Brain and   bone windows were generated. Coronal and sagittal reformats. CT dose reduction   was achieved through use of a standardized protocol tailored for this   examination and automatic exposure control for dose modulation. FINDINGS:   The ventricles and sulci are normal in size, shape and configuration. . There is   no significant white matter disease. There is no intracranial hemorrhage,   extra-axial collection, or mass effect. The basilar cisterns are open. No CT   evidence of acute infarct. The bone windows demonstrate no abnormalities.  The visualized portions of the paranasal sinuses and mastoid air cells are clear. Right frontal scalp hematoma. 02/19/22 0909  CT SPINE CERV WO CONT Final result    Impression:  No evidence of acute fracture. Narrative:  EXAM:  CT CERVICAL SPINE WITHOUT CONTRAST       INDICATION: fall. COMPARISON: None. CONTRAST:  None. TECHNIQUE: Multislice helical CT of the cervical spine was performed without   intravenous contrast administration. Sagittal and coronal reformats were   generated. CT dose reduction was achieved through use of a standardized   protocol tailored for this examination and automatic exposure control for dose   modulation. FINDINGS:       The alignment is within normal limits. There is no fracture or compression   deformity. The odontoid process is intact. The craniocervical junction is within   normal limits. The incidentally imaged soft tissues are within normal limits. Multilevel   endplate degenerative changes are noted throughout cervical spine with facet   arthrosis which is most pronounced at C4-5 causing severe right foraminal   narrowing.            02/19/22 0909  CT SPINE THORAC WO CONT Final result    Impression:      No evidence of fracture       Narrative:  INDICATION: Trauma       COMPARISON: No comparisons. TECHNIQUE:   Noncontrast axial CT imaging of the thoracic spine was performed. Coronal and sagittal reconstructions were obtained. CT dose reduction was achieved through use of a standardized protocol tailored   for this examination and automatic exposure control for dose modulation. FINDINGS:       There is no evidence of fracture or subluxation. Multilevel endplate   degenerative changes are noted. Incidental soft tissue imaging demonstrates   cardiomegaly. Atelectasis in the lung bases. Chronic right-sided rib fractures.                CXR Results  (Last 48 hours)    None            Medical Decision Making   I am the first provider for this patient. I reviewed the vital signs, available nursing notes, past medical history, past surgical history, family history and social history. Vital Signs-Reviewed the patient's vital signs. Patient Vitals for the past 12 hrs:   Temp Pulse Resp BP SpO2   02/19/22 0835 98.3 °F (36.8 °C) (!) 54 16 (!) 142/74 96 %           Records Reviewed: Nursing notes reviewed    Provider Notes (Medical Decision Making):   DDX: Contusion, fracture, ICH. ED Course:   Initial assessment performed. The patients presenting problems have been discussed, and they are in agreement with the care plan formulated and outlined with them. I have encouraged them to ask questions as they arise throughout their visit. PROGRESS NOTE    Pt reevaluated. D head, C-spine and T-spine without acute findings. Reassured patient and son. Will discharge back to assisted living facility. No evidence of significant injury. Written by Jose F Hou MD     Progress note:    Pt noted to be feeling better and ready for discharge. Updated pt and/or family on all final lab and/or  imaging findings. Will follow up as instructed. All questions have been answered, pt voiced understanding and agreement with plan. Specific return precautions provided as well as instructions to return to the ED should sx worsen at any time. Vital signs stable for discharge. I have also put together some discharge instructions for them that include: 1) educational information regarding their diagnosis, 2) how to care for their diagnosis at home, as well a 3) list of reasons why they would want to return to the ED prior to their follow-up appointment, should their condition change. Written by Jose F Hou MD        Critical Care Time:   0    Disposition:  Discharge    PLAN:  1. Current Discharge Medication List        2.    Follow-up Information     Follow up With Specialties Details Why Contact Info    Zeenat Lees MD Family Medicine Schedule an appointment as soon as possible for a visit in 1 week If symptoms worsen [address]      [facility] Go in 1 day If symptoms worsen [address] [address]  [phone]        Return to ED if worse     Diagnosis     Clinical Impression:   1. Fall, initial encounter    2. Contusion of thoracic spine              Please note that this dictation was completed with [employer], the computer voice recognition software. Quite often unanticipated grammatical, syntax, homophones, and other interpretive errors are inadvertently transcribed by the computer software. Please disregard these errors. Please excuse any errors that have escaped final proofreading.

## 2022-02-19 NOTE — ED NOTES
TRANSFER - OUT REPORT:    Verbal report given to MARIA DE JESUS Burks (name) on Gina Sen  being transferred to [facility](unit) for routine progression of care       Report consisted of patients Situation, Background, Assessment and   Recommendations(SBAR). Information from the following report(s) SBAR, Kardex, ED Summary and MAR was reviewed with the receiving nurse. Lines:       Opportunity for questions and clarification was provided.       Patient transported with:   [employer]

## 2022-02-19 NOTE — ED NOTES
Unwitnessed fall between 0645 and 0745 this morning, found on ground with right forehead heamtoma, unknown LOC, baseline mental status when found . LTC staff placed pt in bed , rescue arrived and c - collared and back boarded pt for transport.  Alert, eyes closed and verbal on arrival

## 2022-03-14 ENCOUNTER — APPOINTMENT (OUTPATIENT)
Dept: CT IMAGING | Age: 72
End: 2022-03-14
Attending: EMERGENCY MEDICINE
Payer: MEDICARE

## 2022-03-14 ENCOUNTER — HOSPITAL ENCOUNTER (EMERGENCY)
Age: 72
Discharge: HOME | End: 2022-03-14
Attending: EMERGENCY MEDICINE
Payer: MEDICARE

## 2022-03-14 VITALS
RESPIRATION RATE: 16 BRPM | OXYGEN SATURATION: 97 % | TEMPERATURE: 97.9 F | HEIGHT: 63 IN | DIASTOLIC BLOOD PRESSURE: 72 MMHG | WEIGHT: 230 LBS | SYSTOLIC BLOOD PRESSURE: 148 MMHG | BODY MASS INDEX: 40.75 KG/M2 | HEART RATE: 56 BPM
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
## 2022-03-14 LAB
GLUCOSE BLD STRIP.AUTO-MCNC: 88 MG/DL (ref 65–117)
SERVICE CMNT-IMP: NORMAL

## 2022-03-14 PROCEDURE — 82962 GLUCOSE BLOOD TEST: CPT

## 2022-03-14 PROCEDURE — 72125 CT NECK SPINE W/O DYE: CPT

## 2022-03-14 PROCEDURE — 70450 CT HEAD/BRAIN W/O DYE: CPT

## 2022-03-14 PROCEDURE — 99284 EMERGENCY DEPT VISIT MOD MDM: CPT

## 2022-03-14 NOTE — ED TRIAGE NOTES
Pt arrived by EMS from [facility] after a fall. Per staff pt was found laying on the bathroom floor, per staff pt was on the floor for approx 10 minutes. EMS reports no obvious injury and pt offered no complaints. Pt arrived somnolent and is able to give her name.   Pt educated on ER flow

## 2022-03-14 NOTE — ED PROVIDER NOTES
EMERGENCY DEPARTMENT HISTORY AND PHYSICAL EXAM          Date: 3/14/2022  Patient Name: Larue Duane    History of Presenting Illness     Chief Complaint   Patient presents with    Fall       History Provided By: Patient and Nursing Home/SNF/Rehab Center    HPI: Larue Duane is a 80 y.o. female, pmhx listed below, who presents to the ED c/o fall. According to [facility] living facility, patient had a mechanical fall in the bathroom prior to arrival.  Was seen in our emergency department for a head injury 2 weeks ago and at that time had swelling to right forehead, today [facility] reports they did not notice any injuries. Patient is on the memory care unit at [facility]. Unable to provide a history on her own. Denies any pain at this time. No loss of consciousness witnessed. Arrived by EMS, patient was able to sit up and transfer from wheelchair to stretcher for EMS. PCP: Aster Gonzalez, NP    There are no other complaints, changes, or physical findings at this time.          Past History       Past Medical History:  Past Medical History:   Diagnosis Date    Atrial fibrillation ([address]     Colon polyp     Depression     Diabetes ([facility] [address]     Neuropathy    Hyperlipemia     Hypertension     Hypothyroidism     Morbid obesity with BMI of 40.0-44.9, adult ([facility])        Past Surgical History:  Past Surgical History:   Procedure Laterality Date    HX APPENDECTOMY      HX GYN      Hysterectomy, BTL    HX HEENT      nasal  surgery    HX ORTHOPAEDIC      neck surgery, foot surgery, knee and shoulder surgery    [place] ABDOMEN SURGERY PROC UNLISTED         Family History:  Family History   Problem Relation Age of Onset    Heart Disease Mother        Social History:  Social History     Tobacco Use    Smoking status: Never Smoker    Smokeless tobacco: Never Used   Substance Use Topics    Alcohol use: No    Drug use: No       Current Outpatient Medications   Medication Sig Dispense Refill  escitalopram oxalate (LEXAPRO) 10 mg tablet Take 10 mg by mouth daily.  LORazepam (ATIVAN) 1 mg tablet Take  by mouth every four (4) hours as needed for Anxiety.  rosuvastatin (CRESTOR) 20 mg tablet Take 20 mg by mouth nightly.  memantine (NAMENDA) 10 mg tablet Take  by mouth two (2) times a day.  HYDROcodone-acetaminophen (NORCO) 5-325 mg per tablet Take 0.5-1 Tabs by mouth every six (6) hours as needed for Pain. Max Daily Amount: 4 Tabs. Take in addition to Meloxicam for severe pain flares 20 Tab 0    meloxicam (MOBIC) 15 mg tablet Take 1 Tab by mouth daily. Take with first meal of the day. Indications: OSTEOARTHRITIS 30 Tab 0    ergocalciferol (VITAMIN D2) 50,000 unit capsule Take 50,000 Units by mouth every seven (7) days.  diazepam (VALIUM) 5 mg tablet Take 1 tablet by mouth three (3) times daily as needed for Anxiety (spasm). 20 tablet 0    atorvastatin (LIPITOR) 40 mg tablet Take  by mouth daily.  metformin (GLUCOPHAGE) 500 mg tablet Take 1 Tab by mouth two (2) times daily (with meals). Take  by mouth two (2) times daily (with meals). Do not restart for 2 days 30 Tab 0    aspirin 81 mg chewable tablet Take 162 mg by mouth daily.  verapamil (CALAN) 120 mg tablet Take 1 Tab by mouth daily. 90 Tab 0    levothyroxine (SYNTHROID) 50 mcg tablet Take  by mouth daily (before breakfast). Allergies: Allergies   Allergen Reactions    Tetanus Vaccines And Toxoid Unknown (comments)         Review of Systems   Review of Systems   Unable to perform ROS: Dementia       Physical Exam     Vital Signs-Reviewed the patient's vital signs. Patient Vitals for the past 12 hrs:   Temp Pulse Resp BP SpO2   03/14/22 0800 97.9 °F (36.6 °C) (!) 56 16 (!) 148/72 97 %       Physical Exam  Constitutional:       Appearance: Normal appearance.    HENT:      Head:      Comments: Healing ecchymosis to right forehead, no new hematoma or abrasion/laceration noted     Mouth/Throat:      Mouth: Mucous membranes are moist.   Cardiovascular:      Rate and Rhythm: Normal rate. Heart sounds: Normal heart sounds. Pulmonary:      Effort: Pulmonary effort is normal.   Abdominal:      Palpations: Abdomen is soft. Musculoskeletal:         General: No swelling, tenderness or deformity. Cervical back: Normal range of motion and neck supple. Neurological:      Mental Status: She is alert. Comments: Pupils equal and reactive, awakens to voice, moves all extremities spontaneously. Diagnostic Study Results     Labs -     Recent Results (from the past 12 hour(s))   GLUCOSE, POC    Collection Time: 03/14/22  8:02 AM   Result Value Ref Range    Glucose (POC) 88 65 - 117 mg/dL    Performed by Amador Arenas)        Radiologic Studies -   CT HEAD WO CONT   Final Result   1. No evidence of intracranial hemorrhage. 2. 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 3. Marked decrease in the amount of soft tissue swelling involving the right   side of the forehead. No depressed skull fractures identified. CT SPINE CERV WO CONT   Final Result   1. No evidence of acute traumatic injury. 2. Fusion of the fifth, sixth and seventh cervical vertebral bodies. 3. Presence of prominent posterior osteophyte formation at the C4-5 level with   impression upon the anterior aspect of the thecal sac and spinal cord. CT Results  (Last 48 hours)               03/14/22 0816  CT HEAD WO CONT Final result    Impression:  1. No evidence of intracranial hemorrhage. 2. 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 3. Marked decrease in the amount of soft tissue swelling involving the right   side of the forehead. No depressed skull fractures identified. Narrative:  EXAM: CT HEAD WO CONT       INDICATION: s/p fall       COMPARISON: CT brain dated 2/19/2022. CONTRAST: None.        TECHNIQUE: Unenhanced CT of the head was performed using 5 mm images. Brain and   bone windows were generated. CT dose reduction was achieved through use of a   standardized protocol tailored for this examination and automatic exposure   control for dose modulation. No depressed skull fractures are seen. There is evidence of hyperostosis   frontalis interna There has been a marked decrease in the size of the focal area   soft tissue swelling involving the right side of forehead. Ends of bilateral   maxillary sinus disease (right greater than left). There is no evidence of   intracranial hemorrhage. As seen on axial image 10, a tiny (0.5 cm x 0.3 cm)   focal area of decreased attenuation is associated with the right side of the   ly. In retrospect, a subtle area of decreased attenuation was present in this   region as seen on the previous scan. This may represent an old infarct. 03/14/22 0816  CT SPINE CERV WO CONT Final result    Impression:  1. No evidence of acute traumatic injury. 2. Fusion of the fifth, sixth and seventh cervical vertebral bodies. 3. Presence of prominent posterior osteophyte formation at the C4-5 level with   impression upon the anterior aspect of the thecal sac and spinal cord. Narrative:  EXAM:  CT CERVICAL SPINE WITHOUT CONTRAST       INDICATION: s/p fall. COMPARISON: CT examination of the cervical spine dated 2/19/2022       CONTRAST:  None. TECHNIQUE: Multislice helical CT of the cervical spine was performed without   intravenous contrast administration. Sagittal and coronal reformats were   generated. CT dose reduction was achieved through use of a standardized   protocol tailored for this examination and automatic exposure control for dose   modulation. FINDINGS: There is evidence of cervical spondylosis. There is fusion of the       , 6 and seventh cervical vertebral bodies. There is prominent posterior   osteophyte formation at the C4-5 level.  There is some impression upon the   anterior aspect of the thecal sac and spinal cord at this level. A similar   appearance was present at the time of the previous examination. There is no   evidence of acute traumatic injury. CXR Results  (Last 48 hours)    None            Medical Decision Making   I am the first provider for this patient. I reviewed the vital signs, available nursing notes, past medical history, past surgical history, family history and social history. Records Reviewed: Nursing Notes and Old Medical Records    Provider Notes (Medical Decision Making):   MDM: 69-year-old female in the emergency department after a mechanical fall. We will plan for CT head and C-spine now. All extremities are nontender and patient is able to stand, low suspicion for hip or other long bone fracture. Initial assessment performed. The patients presenting problems have been discussed, and they are in agreement with the care plan formulated and outlined with them. I have encouraged them to ask questions as they arise throughout their visit. PROGRESS NOTE:  ED Course as of 03/14/22 1130   Mon Mar 14, 2022   0332 CT results reviewed and no acute intracranial hemorrhage or cervical spinous fracture. Patient is resting comfortably in the emergency department. Will update patient and [facility] staff on findings, plan for discharge back to living facility. [PV]      ED Course User Index  [PV] Susanna Christiansen MD        Discharge note:  Pt ready for discharge. Son present, we discussed results. Updated pt on all final results. Will follow up as instructed. All questions have been answered, pt voiced understanding and agreement with plan. Specific return precautions provided as well as instructions to return to the ED should sx worsen at any time. Vital signs stable for discharge. Diagnosis     Clinical Impression:   1.  Fall, initial encounter            Disposition:  Discharged    Discharge Medication List as of 3/14/2022  8:49 AM            Please note, this dictation was completed with [employer], the computer voice recognition software. Quite often unanticipated grammatical, syntax, homophones, and other interpretive errors are inadvertently transcribed by the computer software. Please disregard these errors. Please excuse any errors that have escaped final proof reading.

## 2022-11-29 ENCOUNTER — APPOINTMENT (OUTPATIENT)
Dept: CT IMAGING | Age: 72
End: 2022-11-29
Attending: EMERGENCY MEDICINE
Payer: MEDICARE

## 2022-11-29 ENCOUNTER — APPOINTMENT (OUTPATIENT)
Dept: GENERAL RADIOLOGY | Age: 72
End: 2022-11-29
Attending: EMERGENCY MEDICINE
Payer: MEDICARE

## 2022-11-29 ENCOUNTER — HOSPITAL ENCOUNTER (EMERGENCY)
Age: 72
Discharge: HOME | End: 2022-11-29
Attending: EMERGENCY MEDICINE
Payer: MEDICARE

## 2022-11-29 VITALS
DIASTOLIC BLOOD PRESSURE: 70 MMHG | OXYGEN SATURATION: 99 % | HEART RATE: 48 BPM | RESPIRATION RATE: 14 BRPM | TEMPERATURE: 98 F | SYSTOLIC BLOOD PRESSURE: 117 MMHG

## 2022-11-29 DIAGNOSIS — R40.4 TRANSIENT ALTERATION OF AWARENESS: Primary | ICD-10-CM

## 2022-11-29 DIAGNOSIS — S09.90XA CLOSED HEAD INJURY, INITIAL ENCOUNTER: ICD-10-CM

## 2022-11-29 LAB
ALBUMIN SERPL-MCNC: 3.5 G/DL (ref 3.5–5)
ALBUMIN/GLOB SERPL: 0.9 {RATIO} (ref 1.1–2.2)
ALP SERPL-CCNC: 75 U/L (ref 45–117)
ALT SERPL-CCNC: 18 U/L (ref 12–78)
ANION GAP SERPL CALC-SCNC: 4 MMOL/L (ref 5–15)
APPEARANCE UR: CLEAR
AST SERPL-CCNC: 15 U/L (ref 15–37)
BACTERIA URNS QL MICRO: NEGATIVE /HPF
BASOPHILS # BLD: 0 K/UL (ref 0–0.1)
BASOPHILS NFR BLD: 1 % (ref 0–1)
BILIRUB SERPL-MCNC: 1.2 MG/DL (ref 0.2–1)
BILIRUB UR QL: NEGATIVE
BUN SERPL-MCNC: 16 MG/DL (ref 6–20)
BUN/CREAT SERPL: 19 (ref 12–20)
CALCIUM SERPL-MCNC: 9.2 MG/DL (ref 8.5–10.1)
CHLORIDE SERPL-SCNC: 99 MMOL/L (ref 97–108)
CO2 SERPL-SCNC: 35 MMOL/L (ref 21–32)
COLOR UR: NORMAL
CREAT SERPL-MCNC: 0.83 MG/DL (ref 0.55–1.02)
DIFFERENTIAL METHOD BLD: ABNORMAL
EOSINOPHIL # BLD: 0.1 K/UL (ref 0–0.4)
EOSINOPHIL NFR BLD: 2 % (ref 0–7)
EPITH CASTS URNS QL MICRO: NORMAL /LPF
ERYTHROCYTE [DISTWIDTH] IN BLOOD BY AUTOMATED COUNT: 12.3 % (ref 11.5–14.5)
GLOBULIN SER CALC-MCNC: 3.7 G/DL (ref 2–4)
GLUCOSE BLD STRIP.AUTO-MCNC: 121 MG/DL (ref 65–117)
GLUCOSE SERPL-MCNC: 161 MG/DL (ref 65–100)
GLUCOSE UR STRIP.AUTO-MCNC: NEGATIVE MG/DL
HCT VFR BLD AUTO: 41.7 % (ref 35–47)
HGB BLD-MCNC: 14 G/DL (ref 11.5–16)
HGB UR QL STRIP: NEGATIVE
IMM GRANULOCYTES # BLD AUTO: 0 K/UL (ref 0–0.04)
IMM GRANULOCYTES NFR BLD AUTO: 0 % (ref 0–0.5)
KETONES UR QL STRIP.AUTO: NEGATIVE MG/DL
LACTATE SERPL-SCNC: 1.2 MMOL/L (ref 0.4–2)
LEUKOCYTE ESTERASE UR QL STRIP.AUTO: NEGATIVE
LYMPHOCYTES # BLD: 1.2 K/UL (ref 0.8–3.5)
LYMPHOCYTES NFR BLD: 20 % (ref 12–49)
MAGNESIUM SERPL-MCNC: 1.8 MG/DL (ref 1.6–2.4)
MCH RBC QN AUTO: 28.7 PG (ref 26–34)
MCHC RBC AUTO-ENTMCNC: 33.6 G/DL (ref 30–36.5)
MCV RBC AUTO: 85.5 FL (ref 80–99)
MONOCYTES # BLD: 0.5 K/UL (ref 0–1)
MONOCYTES NFR BLD: 8 % (ref 5–13)
NEUTS SEG # BLD: 4.2 K/UL (ref 1.8–8)
NEUTS SEG NFR BLD: 69 % (ref 32–75)
NITRITE UR QL STRIP.AUTO: NEGATIVE
NRBC # BLD: 0 K/UL (ref 0–0.01)
NRBC BLD-RTO: 0 PER 100 WBC
PH UR STRIP: 6 [PH] (ref 5–8)
PLATELET # BLD AUTO: 143 K/UL (ref 150–400)
PMV BLD AUTO: 10.6 FL (ref 8.9–12.9)
POTASSIUM SERPL-SCNC: 3.4 MMOL/L (ref 3.5–5.1)
PROT SERPL-MCNC: 7.2 G/DL (ref 6.4–8.2)
PROT UR STRIP-MCNC: NEGATIVE MG/DL
RBC # BLD AUTO: 4.88 M/UL (ref 3.8–5.2)
RBC #/AREA URNS HPF: NORMAL /HPF (ref 0–5)
SERVICE CMNT-IMP: ABNORMAL
SODIUM SERPL-SCNC: 138 MMOL/L (ref 136–145)
SP GR UR REFRACTOMETRY: 1.02 (ref 1–1.03)
TROPONIN-HIGH SENSITIVITY: 11 NG/L (ref 0–51)
TSH SERPL DL<=0.05 MIU/L-ACNC: 0.97 UIU/ML (ref 0.36–3.74)
UA: UC IF INDICATED,UAUC: NORMAL
UROBILINOGEN UR QL STRIP.AUTO: 0.2 EU/DL (ref 0.2–1)
WBC # BLD AUTO: 6 K/UL (ref 3.6–11)
WBC URNS QL MICRO: NORMAL /HPF (ref 0–4)

## 2022-11-29 PROCEDURE — 87040 BLOOD CULTURE FOR BACTERIA: CPT

## 2022-11-29 PROCEDURE — 36415 COLL VENOUS BLD VENIPUNCTURE: CPT

## 2022-11-29 PROCEDURE — 83735 ASSAY OF MAGNESIUM: CPT

## 2022-11-29 PROCEDURE — 82962 GLUCOSE BLOOD TEST: CPT

## 2022-11-29 PROCEDURE — 84484 ASSAY OF TROPONIN QUANT: CPT

## 2022-11-29 PROCEDURE — 99285 EMERGENCY DEPT VISIT HI MDM: CPT

## 2022-11-29 PROCEDURE — 84443 ASSAY THYROID STIM HORMONE: CPT

## 2022-11-29 PROCEDURE — 74011250636 HC RX REV CODE- 250/636: Performed by: EMERGENCY MEDICINE

## 2022-11-29 PROCEDURE — 85025 COMPLETE CBC W/AUTO DIFF WBC: CPT

## 2022-11-29 PROCEDURE — 83605 ASSAY OF LACTIC ACID: CPT

## 2022-11-29 PROCEDURE — 81001 URINALYSIS AUTO W/SCOPE: CPT

## 2022-11-29 PROCEDURE — 93005 ELECTROCARDIOGRAM TRACING: CPT

## 2022-11-29 PROCEDURE — 51701 INSERT BLADDER CATHETER: CPT

## 2022-11-29 PROCEDURE — 70450 CT HEAD/BRAIN W/O DYE: CPT

## 2022-11-29 PROCEDURE — 80053 COMPREHEN METABOLIC PANEL: CPT

## 2022-11-29 PROCEDURE — 71045 X-RAY EXAM CHEST 1 VIEW: CPT

## 2022-11-29 RX ORDER — SODIUM CHLORIDE 0.9 % (FLUSH) 0.9 %
5-10 SYRINGE (ML) INJECTION AS NEEDED
Status: DISCONTINUED | OUTPATIENT
Start: 2022-11-29 | End: 2022-11-29 | Stop reason: HOSPADM

## 2022-11-29 RX ADMIN — SODIUM CHLORIDE 500 ML: 9 INJECTION, SOLUTION INTRAVENOUS at 10:38

## 2022-11-29 NOTE — ED NOTES
Pt resting comfortably, responded to this RN by making eye contact, did not express anything when asked if there was any pain or if she was uncomfortable. Asked if she WAS comfortable, or warm enough, responded with nod. Bed locked in lowest position, side rails x 2, call bell in reach.

## 2022-11-29 NOTE — DISCHARGE INSTRUCTIONS
ED work-up included metabolic panel and CBC which were unremarkable. Patient urinalysis unremarkable. Patient had a CT of the head unremarkable chest x-ray unremarkable      There is no clear etiology as to what is making the patient more sleepy today.

## 2022-11-29 NOTE — ED TRIAGE NOTES
Pt has pmhx of Alzhiemers/dementia and usually ambulatory but had an unwitnessed fall with unknown LOC. She is less responsive than her baseline. She is usually alert and responsive but at the time of triage is not answering questions.

## 2022-11-29 NOTE — ED PROVIDER NOTES
EMERGENCY DEPARTMENT HISTORY AND PHYSICAL EXAM      Date: 11/29/2022  Patient Name: Mirella Lynch    History of Presenting Illness     Chief Complaint   Patient presents with   Mita Figueroaacher at the [facility]       History Provided By: EMS    HPI: Mirella Lynch, 71 y.o. female presents to the ED with cc of change in mental status. Patient brought from the [facility] secondary to unwitnessed fall. Patient does have some bruising to the forehead that was noted. Patient does have a history of Alzheimer's dementia and is usually awake alert and does communicate however this morning she is not. There have been no recent seizure activity. There is been no recent fever or chills. There have been no reports of shortness of breath or cough. There has been no reports of any vomiting or diarrhea. There are no other complaints, changes, or physical findings at this time. PCP: Ilda Hill NP    No current facility-administered medications on file prior to encounter. Current Outpatient Medications on File Prior to Encounter   Medication Sig Dispense Refill    escitalopram oxalate (LEXAPRO) 10 mg tablet Take 10 mg by mouth daily.  LORazepam (ATIVAN) 1 mg tablet Take  by mouth every four (4) hours as needed for Anxiety.  rosuvastatin (CRESTOR) 20 mg tablet Take 20 mg by mouth nightly.  memantine (NAMENDA) 10 mg tablet Take  by mouth two (2) times a day.  HYDROcodone-acetaminophen (NORCO) 5-325 mg per tablet Take 0.5-1 Tabs by mouth every six (6) hours as needed for Pain. Max Daily Amount: 4 Tabs. Take in addition to Meloxicam for severe pain flares 20 Tab 0    meloxicam (MOBIC) 15 mg tablet Take 1 Tab by mouth daily. Take with first meal of the day. Indications: OSTEOARTHRITIS 30 Tab 0    ergocalciferol (VITAMIN D2) 50,000 unit capsule Take 50,000 Units by mouth every seven (7) days.       diazepam (VALIUM) 5 mg tablet Take 1 tablet by mouth three (3) times daily as needed for Anxiety (spasm). 20 tablet 0    atorvastatin (LIPITOR) 40 mg tablet Take  by mouth daily.  metformin (GLUCOPHAGE) 500 mg tablet Take 1 Tab by mouth two (2) times daily (with meals). Take  by mouth two (2) times daily (with meals). Do not restart for 2 days 30 Tab 0    aspirin 81 mg chewable tablet Take 162 mg by mouth daily.  verapamil (CALAN) 120 mg tablet Take 1 Tab by mouth daily. 90 Tab 0    levothyroxine (SYNTHROID) 50 mcg tablet Take  by mouth daily (before breakfast). Past History     Past Medical History:  Past Medical History:   Diagnosis Date    Atrial fibrillation ([address]     Colon polyp     Depression     Diabetes ([address]     Neuropathy    Hyperlipemia     Hypertension     Hypothyroidism     Morbid obesity with BMI of 40.0-44.9, adult (HCC)        Past Surgical History:  Past Surgical History:   Procedure Laterality Date    HX APPENDECTOMY      HX GYN      Hysterectomy, BTL    HX HEENT      nasal  surgery    HX ORTHOPAEDIC      neck surgery, foot surgery, knee and shoulder surgery    MT ABDOMEN SURGERY PROC UNLISTED         Family History:  Family History   Problem Relation Age of Onset    Heart Disease Mother        Social History:  Social History     Tobacco Use    Smoking status: Never    Smokeless tobacco: Never   Substance Use Topics    Alcohol use: No    Drug use: No       Allergies: Allergies   Allergen Reactions    Tetanus Vaccines And Toxoid Unknown (comments)         Review of Systems   Review of Systems   Unable to perform ROS: Patient nonverbal     Physical Exam   Physical Exam  Vitals and nursing note reviewed. Constitutional:       General: She is not in acute distress. Appearance: She is well-developed. She is obese. She is not diaphoretic. HENT:      Head: Normocephalic. Comments: Abrasion to right forehead with small hematoma        Mouth/Throat:      Mouth: Mucous membranes are dry.       Pharynx: No oropharyngeal exudate. Eyes:      Extraocular Movements: Extraocular movements intact. Conjunctiva/sclera: Conjunctivae normal.      Pupils: Pupils are equal, round, and reactive to light. Neck:      Vascular: No JVD. Trachea: No tracheal deviation. Cardiovascular:      Rate and Rhythm: Normal rate and regular rhythm. Heart sounds: Normal heart sounds. No murmur heard. Pulmonary:      Effort: Pulmonary effort is normal. No respiratory distress. Breath sounds: Normal breath sounds. No stridor. No wheezing or rales. Abdominal:      General: There is no distension. Palpations: Abdomen is soft. Tenderness: There is no abdominal tenderness. There is no guarding or rebound. Musculoskeletal:         General: No tenderness. Normal range of motion. Cervical back: Normal range of motion and neck supple. Skin:     General: Skin is warm and dry. Neurological:      Mental Status: She is alert. Cranial Nerves: No cranial nerve deficit.       Comments: Pt awake, not alert, non-verbal, Does respond to pain, no nystagmus      Psychiatric:      Comments: Unable to assess         Diagnostic Study Results     Labs -     Recent Results (from the past 12 hour(s))   EKG, 12 LEAD, INITIAL    Collection Time: 11/29/22 10:30 AM   Result Value Ref Range    Ventricular Rate 55 BPM    Atrial Rate 55 BPM    P-R Interval 132 ms    QRS Duration 142 ms    Q-T Interval 506 ms    QTC Calculation (Bezet) 484 ms    Calculated P Axis 47 degrees    Calculated R Axis 51 degrees    Calculated T Axis 63 degrees    Diagnosis       Sinus bradycardia  Left bundle branch block  Abnormal ECG  When compared with ECG of 09-JAN-2021 17:58,  No significant change was found     GLUCOSE, POC    Collection Time: 11/29/22 10:37 AM   Result Value Ref Range    Glucose (POC) 121 (H) 65 - 117 mg/dL    Performed by Yoanna Doe ()    LACTIC ACID    Collection Time: 11/29/22 10:39 AM   Result Value Ref Range    Lactic acid 1.2 0.4 - 2.0 MMOL/L   CBC WITH AUTOMATED DIFF    Collection Time: 11/29/22 10:39 AM   Result Value Ref Range    WBC 6.0 3.6 - 11.0 K/uL    RBC 4.88 3.80 - 5.20 M/uL    HGB 14.0 11.5 - 16.0 g/dL    HCT 41.7 35.0 - 47.0 %    MCV 85.5 80.0 - 99.0 FL    MCH 28.7 26.0 - 34.0 PG    MCHC 33.6 30.0 - 36.5 g/dL    RDW 12.3 11.5 - 14.5 %    PLATELET 941 (L) 159 - 400 K/uL    MPV 10.6 8.9 - 12.9 FL    NRBC 0.0 0  WBC    ABSOLUTE NRBC 0.00 0.00 - 0.01 K/uL    NEUTROPHILS 69 32 - 75 %    LYMPHOCYTES 20 12 - 49 %    MONOCYTES 8 5 - 13 %    EOSINOPHILS 2 0 - 7 %    BASOPHILS 1 0 - 1 %    IMMATURE GRANULOCYTES 0 0.0 - 0.5 %    ABS. NEUTROPHILS 4.2 1.8 - 8.0 K/UL    ABS. LYMPHOCYTES 1.2 0.8 - 3.5 K/UL    ABS. MONOCYTES 0.5 0.0 - 1.0 K/UL    ABS. EOSINOPHILS 0.1 0.0 - 0.4 K/UL    ABS. BASOPHILS 0.0 0.0 - 0.1 K/UL    ABS. IMM. GRANS. 0.0 0.00 - 0.04 K/UL    DF AUTOMATED     METABOLIC PANEL, COMPREHENSIVE    Collection Time: 11/29/22 10:39 AM   Result Value Ref Range    Sodium 138 136 - 145 mmol/L    Potassium 3.4 (L) 3.5 - 5.1 mmol/L    Chloride 99 97 - 108 mmol/L    CO2 35 (H) 21 - 32 mmol/L    Anion gap 4 (L) 5 - 15 mmol/L    Glucose 161 (H) 65 - 100 mg/dL    BUN 16 6 - 20 MG/DL    Creatinine 0.83 0.55 - 1.02 MG/DL    BUN/Creatinine ratio 19 12 - 20      eGFR >60 >60 ml/min/1.73m2    Calcium 9.2 8.5 - 10.1 MG/DL    Bilirubin, total 1.2 (H) 0.2 - 1.0 MG/DL    ALT (SGPT) 18 12 - 78 U/L    AST (SGOT) 15 15 - 37 U/L    Alk.  phosphatase 75 45 - 117 U/L    Protein, total 7.2 6.4 - 8.2 g/dL    Albumin 3.5 3.5 - 5.0 g/dL    Globulin 3.7 2.0 - 4.0 g/dL    A-G Ratio 0.9 (L) 1.1 - 2.2     TROPONIN-HIGH SENSITIVITY    Collection Time: 11/29/22 10:39 AM   Result Value Ref Range    Troponin-High Sensitivity 11 0 - 51 ng/L   MAGNESIUM    Collection Time: 11/29/22 10:39 AM   Result Value Ref Range    Magnesium 1.8 1.6 - 2.4 mg/dL   TSH 3RD GENERATION    Collection Time: 11/29/22 10:39 AM   Result Value Ref Range    TSH 0.97 0.36 - 3.74 uIU/mL URINALYSIS W/ REFLEX CULTURE    Collection Time: 11/29/22 11:00 AM    Specimen: Urine   Result Value Ref Range    Color YELLOW/STRAW      Appearance CLEAR CLEAR      Specific gravity 1.020 1.003 - 1.030      pH (UA) 6.0 5.0 - 8.0      Protein Negative NEG mg/dL    Glucose Negative NEG mg/dL    Ketone Negative NEG mg/dL    Bilirubin Negative NEG      Blood Negative NEG      Urobilinogen 0.2 0.2 - 1.0 EU/dL    Nitrites Negative NEG      Leukocyte Esterase Negative NEG      WBC 0-4 0 - 4 /hpf    RBC 0-5 0 - 5 /hpf    Epithelial cells FEW FEW /lpf    Bacteria Negative NEG /hpf    UA:UC IF INDICATED CULTURE NOT INDICATED BY UA RESULT CNI         Radiologic Studies -   CT HEAD WO CONT   Final Result   No acute abnormality. XR CHEST PORT   Final Result   No acute process. CT Results  (Last 48 hours)      None          CXR Results  (Last 48 hours)      None            Medical Decision Making   I am the first provider for this patient. I reviewed the vital signs, available nursing notes, past medical history, past surgical history, family history and social history. Vital Signs-Reviewed the patient's vital signs. Patient Vitals for the past 12 hrs:   Temp Pulse Resp BP SpO2   11/29/22 1014 98 °F (36.7 °C) (!) 57 16 109/75 95 %       EKG interpretation: (Preliminary)  Sinus taylor, rate 55, normal axis/pr, prolonged qrs, LBBB, No acute ST changes, Guera Meth, DO    Records Reviewed: Nursing Notes, Old Medical Records, Ambulance Run Sheet, Previous Radiology Studies, and Previous Laboratory Studies, last ED visit 3/14/2022 secondary to fall. Patient does have history of Alzheimer's dementia    Provider Notes (Medical Decision Making):   Ddx-metabolic encephalopathy, intracranial hemorrhage, CVA, pneumonia, UTI    ED Course:   Initial assessment performed. The patients presenting problems have been discussed, and they are in agreement with the care plan formulated and outlined with them.   I have encouraged them to ask questions as they arise throughout their visit. ED work-up unremarkable. Patient's head CT negative chest x-ray negative no urinary tract infection. Patient did seem to become a little bit more awake and alert prior to discharge. Patient discharged back to [address]. Disposition:  Patient transferred back to the [facility]. They have been notified the patient will be coming back to them. DISCHARGE PLAN:  1. Current Discharge Medication List      No med changes    2. Follow-up Information       Follow up With Specialties Details Why Contact Info    Dominic Rodarte NP Nurse Practitioner  As needed Josh [phone] [address] [place] [phone]            3. Return to ED if worse     Diagnosis     Clinical Impression:   1. Transient alteration of awareness    2. Closed head injury, initial encounter        Attestations:    Bradford Lew, DO        Please note that this dictation was completed with [employer] the computer voice recognition software. Quite often unanticipated grammatical, syntax, homophones, and other interpretive errors are inadvertently transcribed by the computer software. Please disregard these errors. Please excuse any errors that have escaped final proofreading. Thank you.

## 2022-11-29 NOTE — ED NOTES
Pt being cleaned for extensive diarrhea bowel movement in brief. Unable to assist or follow commands well.

## 2022-11-29 NOTE — ED TRIAGE NOTES
Unwitnessed fall at the [facility].  Staff states she is more altered than usual.

## 2022-11-30 LAB
ATRIAL RATE: 55 BPM
CALCULATED P AXIS, ECG09: 47 DEGREES
CALCULATED R AXIS, ECG10: 51 DEGREES
CALCULATED T AXIS, ECG11: 63 DEGREES
DIAGNOSIS, 93000: NORMAL
P-R INTERVAL, ECG05: 132 MS
Q-T INTERVAL, ECG07: 506 MS
QRS DURATION, ECG06: 142 MS
QTC CALCULATION (BEZET), ECG08: 484 MS
VENTRICULAR RATE, ECG03: 55 BPM

## 2022-12-04 LAB
BACTERIA SPEC CULT: NORMAL
BACTERIA SPEC CULT: NORMAL
SERVICE CMNT-IMP: NORMAL
SERVICE CMNT-IMP: NORMAL

## 2022-12-11 ENCOUNTER — HOSPITAL ENCOUNTER (EMERGENCY)
Age: 72
Discharge: HOME | End: 2022-12-11
Attending: EMERGENCY MEDICINE
Payer: MEDICARE

## 2022-12-11 ENCOUNTER — APPOINTMENT (OUTPATIENT)
Dept: CT IMAGING | Age: 72
End: 2022-12-11
Attending: EMERGENCY MEDICINE
Payer: MEDICARE

## 2022-12-11 VITALS
SYSTOLIC BLOOD PRESSURE: 142 MMHG | WEIGHT: 150 LBS | BODY MASS INDEX: 26.57 KG/M2 | RESPIRATION RATE: 16 BRPM | HEART RATE: 62 BPM | DIASTOLIC BLOOD PRESSURE: 94 MMHG | TEMPERATURE: 98.6 F | OXYGEN SATURATION: 97 %
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
## 2022-12-11 DIAGNOSIS — S01.01XA LACERATION OF SCALP, INITIAL ENCOUNTER: Primary | ICD-10-CM

## 2022-12-11 PROCEDURE — 74011250637 HC RX REV CODE- 250/637: Performed by: EMERGENCY MEDICINE

## 2022-12-11 PROCEDURE — 75810000293 HC SIMP/SUPERF WND  RPR

## 2022-12-11 PROCEDURE — 99284 EMERGENCY DEPT VISIT MOD MDM: CPT

## 2022-12-11 PROCEDURE — 70450 CT HEAD/BRAIN W/O DYE: CPT

## 2022-12-11 RX ORDER — ACETAMINOPHEN 500 MG
1000 TABLET ORAL
Status: COMPLETED | OUTPATIENT
Start: 2022-12-11 | End: 2022-12-11

## 2022-12-11 RX ADMIN — ACETAMINOPHEN 1000 MG: 500 TABLET ORAL at 17:12

## 2022-12-11 NOTE — DISCHARGE INSTRUCTIONS
Adam Rosas was sent to the emergency room after a fall. Her CT head does not show any bleeding and her wound has been repaired after being cleansed, with glue and Steri-Strips. You can apply ice as needed for the hematoma on the side of her face.   If she has any vomiting or other new complaints, return to the ER for evaluation

## 2022-12-11 NOTE — ED TRIAGE NOTES
Unwitnessed fall onto carpet just PTA , no LOC , baseline dementia, 1 ich laceration to right eyebrow

## 2022-12-11 NOTE — ED PROVIDER NOTES
EMERGENCY DEPARTMENT HISTORY AND PHYSICAL EXAM          Date: 12/11/2022  Patient Name: Rubi Barton    History of Presenting Illness     Chief Complaint   Patient presents with   [facility] Fall       History Provided By: EMS    HPI: Rubi Barton is a 71 y.o. female, pmhx dementia, who presents via EMS to the ED c/o fall    According to paramedics the staff at her facility heard a thud and ran into the hallway and found patient laying in the hallway covered in blood. They got her into her room cleaned her up and patient was transported to the ER without event. Given patient's severity of dementia she is not able to answer any ROS or HPI questions. PCP: Santos Sanders NP    There are no other complaints, changes, or physical findings at this time. Current Outpatient Medications   Medication Sig Dispense Refill    escitalopram oxalate (LEXAPRO) 10 mg tablet Take 10 mg by mouth daily.  LORazepam (ATIVAN) 1 mg tablet Take  by mouth every four (4) hours as needed for Anxiety.  rosuvastatin (CRESTOR) 20 mg tablet Take 20 mg by mouth nightly.  memantine (NAMENDA) 10 mg tablet Take  by mouth two (2) times a day.  HYDROcodone-acetaminophen (NORCO) 5-325 mg per tablet Take 0.5-1 Tabs by mouth every six (6) hours as needed for Pain. Max Daily Amount: 4 Tabs. Take in addition to Meloxicam for severe pain flares 20 Tab 0    meloxicam (MOBIC) 15 mg tablet Take 1 Tab by mouth daily. Take with first meal of the day. Indications: OSTEOARTHRITIS 30 Tab 0    ergocalciferol (VITAMIN D2) 50,000 unit capsule Take 50,000 Units by mouth every seven (7) days.  diazepam (VALIUM) 5 mg tablet Take 1 tablet by mouth three (3) times daily as needed for Anxiety (spasm). 20 tablet 0    atorvastatin (LIPITOR) 40 mg tablet Take  by mouth daily.  metformin (GLUCOPHAGE) 500 mg tablet Take 1 Tab by mouth two (2) times daily (with meals). Take  by mouth two (2) times daily (with meals).  Do not restart for 2 days 30 Tab 0    aspirin 81 mg chewable tablet Take 162 mg by mouth daily.  verapamil (CALAN) 120 mg tablet Take 1 Tab by mouth daily. 90 Tab 0    levothyroxine (SYNTHROID) 50 mcg tablet Take  by mouth daily (before breakfast). Past History     Past Medical History:  Past Medical History:   Diagnosis Date    At[facility]     Colon polyp     Depression     Diabetes ([facility]     Neuropathy    Hyperlipemia     Hypertension     Hypothyroidism     Morbid obesity with BMI of 40.0-44.9, adult ([facility])        Past Surgical History:  Past Surgical History:   Procedure Laterality Date    HX APPENDECTOMY      HX GYN      Hysterectomy, BTL    HX HEENT      nasal  surgery    HX ORTHOPAEDIC      neck surgery, foot surgery, knee and shoulder surgery    CA ABDOMEN SURGERY PROC UNLISTED         Family History:  Family History   Problem Relation Age of Onset    Heart Disease Mother        Social History:  Social History     Tobacco Use    Smoking status: Never    Smokeless tobacco: Never   Substance Use Topics    Alcohol use: No    Drug use: No       Allergies: Allergies   Allergen Reactions    Tetanus Vaccines And Toxoid Unknown (comments)         Review of Systems   Review of Systems   Unable to perform ROS: Dementia     Physical Exam   Physical Exam  Vitals and nursing note reviewed. Constitutional:       Appearance: She is well-developed. She is not diaphoretic. HENT:      Head: Normocephalic. Comments: Rt temporal laceration  Eyes:      General:         Right eye: No discharge. Left eye: No discharge. Conjunctiva/sclera: Conjunctivae normal.      Pupils: Pupils are equal, round, and reactive to light. Cardiovascular:      Rate and Rhythm: Normal rate and regular rhythm. Heart sounds: Normal heart sounds. No murmur heard. Pulmonary:      Effort: Pulmonary effort is normal. No respiratory distress. Breath sounds: Normal breath sounds.  No wheezing or rales.   Abdominal:      General: Bowel sounds are normal. There is no distension. Palpations: Abdomen is soft. Tenderness: There is no abdominal tenderness. Musculoskeletal:         General: Normal range of motion. Cervical back: Normal range of motion and neck supple. Comments: All bones were palpated and joints ranged. Patient did not grimace on any movement including rocking of her hips bending her knees and flexing her hips/externally and internally rotating her hips. Skin:     General: Skin is warm and dry. Findings: No rash. Neurological:      Mental Status: She is alert and oriented to person, place, and time. Cranial Nerves: No cranial nerve deficit. Motor: No abnormal muscle tone. Diagnostic Study Results     Labs -   No results found for this or any previous visit (from the past 12 hour(s)). Radiologic Studies -   CT HEAD WO CONT   Final Result   No acute intracranial abnormality. CT Results  (Last 48 hours)                 12/11/22 4204  CT HEAD WO CONT Final result    Impression:  No acute intracranial abnormality. Narrative:  EXAM: CT HEAD WO CONT       INDICATION: Fall, altered mental status       COMPARISON: November 29, 2022. CONTRAST: None. TECHNIQUE: Unenhanced CT of the head was performed using 5 mm images. Brain and   bone windows were generated. Coronal and sagittal reformats. CT dose reduction   was achieved through use of a standardized protocol tailored for this   examination and automatic exposure control for dose modulation. FINDINGS:   Prominence of the ventricles and sulci indicative of age related involutional   changes. . There is no significant white matter disease. There is no intracranial   hemorrhage, extra-axial collection, or mass effect. The basilar cisterns are   open. No CT evidence of acute infarct. The bone windows demonstrate no abnormalities.  The visualized portions of the   paranasal sinuses and mastoid air cells are clear. Staples in the left frontal   scalp. CXR Results  (Last 48 hours)      None              Medical Decision Making   I am the first provider for this patient. I reviewed the vital signs, available nursing notes, past medical history, past surgical history, family history and social history. Vital Signs-Reviewed the patient's vital signs. Pulse Oximetry Analysis - 97% on RA    Records Reviewed: Nursing Notes and Old Medical Records    Provider Notes (Medical Decision Making):   MDM: Elderly female brought in from her SNF for fall with head injury. Unable to assess review of system or HPI given patient's level of dementia. On exam I do not see any other focal evidence of trauma. She is hemodynamically stable without concern for acute infection. Patient to go to imaging CT and we will repair her wound. ED Course:   Initial assessment performed. The patients presenting problems have been discussed, and they are in agreement with the care plan formulated and outlined with them. I have encouraged them to ask questions as they arise throughout their visit. ED Course as of 12/12/22 1325   Sun Dec 11, 2022   1745 Tolerated wound care to 3cm linear right temporal scalp laceration with adhesive and steri strips. Total time at bedside 5 minutes [JT]      ED Course User Index  [JT] Ar Jefferson MD        Discharge note:  Pt appropriate for discharge with outpatient management. Will follow up as instructed. All questions have been answered, pt voiced understanding and agreement with plan. Specific return precautions provided as well as instructions to return to the ED should sx worsen at any time. Vital signs stable for discharge. Critical Care Time:   0      Diagnosis     Clinical Impression:   1. Laceration of scalp, initial encounter    2. Fall, initial encounter        PLAN:  1.    Discharge Medication List as of 12/11/2022  6:11 PM        2. Follow-up Information       Follow up With Specialties Details Why Contact Info    [facility] Emergency Medicine  If symptoms worsen [address] [phone]          Return to ED if worse     Disposition:  Home       Please note, this dictation was completed with [employer], the computer voice recognition software. Quite often unanticipated grammatical, syntax, homophones, and other interpretive errors are inadvertently transcribed by the computer software. Please disregard these errors. Please excuse any errors that have escaped final proof reading.

## 2022-12-12 NOTE — ED NOTES
I have reviewed discharge instructions with the caregiver. The caregiver verbalized understanding. Discharge medications discussed with patient. No questions at this time. Ambulated without difficulty. Report  Called to [facility] WEN IBRAHIM.

## 2022-12-15 ENCOUNTER — HOSPITAL ENCOUNTER (EMERGENCY)
Age: 72
Discharge: HOME | End: 2022-12-15
Attending: EMERGENCY MEDICINE
Payer: MEDICARE

## 2022-12-15 ENCOUNTER — APPOINTMENT (OUTPATIENT)
Dept: CT IMAGING | Age: 72
End: 2022-12-15
Attending: EMERGENCY MEDICINE
Payer: MEDICARE

## 2022-12-15 VITALS
WEIGHT: 150 LBS | SYSTOLIC BLOOD PRESSURE: 124 MMHG | TEMPERATURE: 97.9 F | HEIGHT: 63 IN | RESPIRATION RATE: 15 BRPM | HEART RATE: 74 BPM | DIASTOLIC BLOOD PRESSURE: 81 MMHG | BODY MASS INDEX: 26.58 KG/M2 | OXYGEN SATURATION: 95 %
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
## 2022-12-15 DIAGNOSIS — S02.40CA CLOSED FRACTURE OF RIGHT SIDE OF MAXILLA, INITIAL ENCOUNTER (HCC): ICD-10-CM

## 2022-12-15 DIAGNOSIS — S00.33XA CONTUSION OF NOSE, INITIAL ENCOUNTER: ICD-10-CM
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
## 2022-12-15 LAB
ATRIAL RATE: 72 BPM
CALCULATED P AXIS, ECG09: 25 DEGREES
CALCULATED R AXIS, ECG10: 79 DEGREES
CALCULATED T AXIS, ECG11: 44 DEGREES
DIAGNOSIS, 93000: NORMAL
P-R INTERVAL, ECG05: 148 MS
Q-T INTERVAL, ECG07: 460 MS
QRS DURATION, ECG06: 138 MS
QTC CALCULATION (BEZET), ECG08: 503 MS
VENTRICULAR RATE, ECG03: 72 BPM

## 2022-12-15 PROCEDURE — 93005 ELECTROCARDIOGRAM TRACING: CPT

## 2022-12-15 PROCEDURE — 99284 EMERGENCY DEPT VISIT MOD MDM: CPT

## 2022-12-15 PROCEDURE — 70486 CT MAXILLOFACIAL W/O DYE: CPT

## 2022-12-15 PROCEDURE — 70450 CT HEAD/BRAIN W/O DYE: CPT

## 2022-12-15 PROCEDURE — 74011250637 HC RX REV CODE- 250/637: Performed by: FAMILY MEDICINE

## 2022-12-15 PROCEDURE — 72125 CT NECK SPINE W/O DYE: CPT

## 2022-12-15 RX ORDER — CEPHALEXIN 500 MG/1
500 CAPSULE ORAL 3 TIMES DAILY
Qty: 21 CAPSULE | Refills: 0 | Status: SHIPPED | OUTPATIENT
Start: 2022-12-15 | End: 2022-12-22

## 2022-12-15 RX ORDER — CEPHALEXIN 250 MG/1
250 CAPSULE ORAL
Status: COMPLETED | OUTPATIENT
Start: 2022-12-15 | End: 2022-12-15

## 2022-12-15 RX ADMIN — CEPHALEXIN 250 MG: 250 CAPSULE ORAL at 19:27

## 2022-12-15 NOTE — DISCHARGE INSTRUCTIONS
Please send patient back to the emergency department for reevaluation if she becomes more obtunded, is having difficulty walking, has a fainting episode or seizure, or has any nausea or vomiting.

## 2022-12-15 NOTE — ED TRIAGE NOTES
[facility] Staff reported to EMS that pt was walking around the memory unit and tripped over another pts wheelchair. Blood noted in pts nose, mouth and hands. Pt arrived awake and alert.   Pt educated on ER flow

## 2022-12-15 NOTE — ED NOTES
pts daughter Harrfantasma Shock called to check on this patient, per Harrie Shock pts daughter in law, [employer] is on the way.

## 2022-12-15 NOTE — ED PROVIDER NOTES
EMERGENCY DEPARTMENT HISTORY AND PHYSICAL EXAM  MAX Bailey MD            Date: 12/15/2022  Patient Name: Georgi Reinoso    History of Presenting Illness     Chief Complaint   Patient presents with    Fall       History Provided By: EMS and Nursing Home/SNF/Rehab Center    HPI: Georgi Reinoso is a 71 y.o. female, pmhx dementia, frequent falls, list below, who presents by ambulance to the ED for evaluation of facial injury after fall. Patient was seen to trip over another patient's wheelchair at [address] [place] [address], fell forward and hit her face on the floor. Unknown loss of consciousness, but patient was alert when found by staff. Patient was here for similar fall 4 days ago, had repair of right facial laceration. Patient is alert, does not appear in any distress, unable to give any history due to severe dementia. PCP: Hemanth Mcclure NP        Current Outpatient Medications   Medication Sig Dispense Refill    cephALEXin (Keflex) 500 mg capsule Take 1 Capsule by mouth three (3) times daily for 7 days. 21 Capsule 0    escitalopram oxalate (LEXAPRO) 10 mg tablet Take 10 mg by mouth daily.  LORazepam (ATIVAN) 1 mg tablet Take  by mouth every four (4) hours as needed for Anxiety.  rosuvastatin (CRESTOR) 20 mg tablet Take 20 mg by mouth nightly.  memantine (NAMENDA) 10 mg tablet Take  by mouth two (2) times a day.  HYDROcodone-acetaminophen (NORCO) 5-325 mg per tablet Take 0.5-1 Tabs by mouth every six (6) hours as needed for Pain. Max Daily Amount: 4 Tabs. Take in addition to Meloxicam for severe pain flares 20 Tab 0    meloxicam (MOBIC) 15 mg tablet Take 1 Tab by mouth daily. Take with first meal of the day. Indications: OSTEOARTHRITIS 30 Tab 0    ergocalciferol (VITAMIN D2) 50,000 unit capsule Take 50,000 Units by mouth every seven (7) days.  diazepam (VALIUM) 5 mg tablet Take 1 tablet by mouth three (3) times daily as needed for Anxiety (spasm).  21 tablet 0    atorvastatin (LIPITOR) 40 mg tablet Take  by mouth daily.  metformin (GLUCOPHAGE) 500 mg tablet Take 1 Tab by mouth two (2) times daily (with meals). Take  by mouth two (2) times daily (with meals). Do not restart for 2 days 30 Tab 0    aspirin 81 mg chewable tablet Take 162 mg by mouth daily.  verapamil (CALAN) 120 mg tablet Take 1 Tab by mouth daily. 90 Tab 0    levothyroxine (SYNTHROID) 50 mcg tablet Take  by mouth daily (before breakfast). Past History     Past Medical History:  Past Medical History:   Diagnosis Date    Atrial fibrillation [facility] [facility]     Colon polyp     Depression     Diabetes ([facility]     Neuropathy    Hyperlipemia     Hypertension     Hypothyroidism     Morbid obesity with BMI of 40.0-44.9, adult ([facility])        Past Surgical History:  Past Surgical History:   Procedure Laterality Date    HX APPENDECTOMY      HX GYN      Hysterectomy, BTL    HX HEENT      nasal  surgery    HX ORTHOPAEDIC      neck surgery, foot surgery, knee and shoulder surgery    WA ABDOMEN SURGERY PROC UNLISTED         Family History:  Family History   Problem Relation Age of Onset    Heart Disease Mother        Social History:  Social History     Tobacco Use    Smoking status: Never    Smokeless tobacco: Never   Substance Use Topics    Alcohol use: No    Drug use: No     Social Hx: No tobacco, No vaping, No EtOH    Allergies: Allergies   Allergen Reactions    Tetanus Vaccines And Toxoid Unknown (comments)         Review of Systems   Review of Systems   Unable to perform ROS: Dementia     Physical Exam   Physical Exam  Vitals and nursing note reviewed. Constitutional:       Comments: Patient is alert, awake, mumbles tangential answers to questions, has old ecchymosis, Steri-Strips over right eye. Patient has some fresh blood at both nares, but no deformity to the nose, no obvious facial deformity. Patient does not appear to have neck injury. HENT:      Head: Normocephalic. Right Ear: External ear normal.      Left Ear: External ear normal.      Nose:      Comments: Nares with blood bilaterally, no nasal deformity, no septal hematoma  Eyes:      Pupils: Pupils are equal, round, and reactive to light. Cardiovascular:      Rate and Rhythm: Normal rate and regular rhythm. Pulmonary:      Effort: Pulmonary effort is normal.      Breath sounds: Normal breath sounds. Abdominal:      Palpations: Abdomen is soft. Tenderness: There is no abdominal tenderness. Musculoskeletal:         General: Normal range of motion. Cervical back: Normal range of motion and neck supple. No rigidity or tenderness. Skin:     General: Skin is warm and dry. Neurological:      General: No focal deficit present. Mental Status: She is alert and oriented to person, place, and time. Psychiatric:         Mood and Affect: Mood normal.         Behavior: Behavior normal.       Diagnostic Study Results     Labs -   No results found for this or any previous visit (from the past 12 hour(s)). Radiologic Studies -   CT HEAD WO CONT   Final Result   1. Age-indeterminate fracture at the anterior aspect of the right maxillary   alveolar process with malpositioning of the associated tooth (? Second premolar)   with periapical lucency (2-35, 6-15). 2.  Chronic bilateral nasal bone fracture deformities. 3.  No acute intracranial abnormality. Left-sided scalp laceration. CT MAXILLOFACIAL WO CONT   Final Result   1. Age-indeterminate fracture at the anterior aspect of the right maxillary   alveolar process with malpositioning of the associated tooth (? Second premolar)   with periapical lucency (2-35, 6-15). 2.  Chronic bilateral nasal bone fracture deformities. 3.  No acute intracranial abnormality. Left-sided scalp laceration. CT SPINE CERV WO CONT   Final Result      1. No acute abnormality. 2. Osseous fusion from C5 to C7.   3. Unchanged multilevel spondylosis. CT Results  (Last 48 hours)                 12/15/22 1819  CT HEAD WO CONT Final result    Impression:  1. Age-indeterminate fracture at the anterior aspect of the right maxillary   alveolar process with malpositioning of the associated tooth (? Second premolar)   with periapical lucency (2-35, 6-15). 2.  Chronic bilateral nasal bone fracture deformities. 3.  No acute intracranial abnormality. Left-sided scalp laceration. Narrative:  EXAM: CT HEAD WO CONT,       EXAM: CT MAXILLOFACIAL WO CONT       INDICATION: fall, facial injury       COMPARISON: CT head 12/11/2022       CONTRAST:   None. TECHNIQUE:  Multislice helical CT of the head and facial bones was performed in   the axial plane without intravenous contrast administration. Coronal and   sagittal reformations were generated. CT dose reduction was achieved through   use of a standardized protocol tailored for this examination and automatic   exposure control for dose modulation. FINDINGS:       HEAD:   Generalized volume loss. Scattered white matter hypodensities may reflect   chronic microangiopathic change. There is no intracranial hemorrhage,   extra-axial collection, or mass effect. The basilar cisterns are open. No CT   evidence of acute infarct. MAXILLOFACIAL:   Bones: Age-indeterminate fracture at the anterior aspect of the right maxillary   alveolar process with malpositioning of the associated tooth with periapical   lucency (2-35, 6-15). Chronic bilateral nasal bone fracture deformities. Paranasal sinuses: Clear   Orbits: The globes, optic nerves, and extraocular muscles are within normal   limits. Soft tissues: Left frontal scalp laceration with skin staples. Miscellaneous: N/A            12/15/22 1819  CT MAXILLOFACIAL WO CONT Final result    Impression:  1.   Age-indeterminate fracture at the anterior aspect of the right maxillary   alveolar process with malpositioning of the associated tooth (? Second premolar)   with periapical lucency (2-35, 6-15). 2.  Chronic bilateral nasal bone fracture deformities. 3.  No acute intracranial abnormality. Left-sided scalp laceration. Narrative:  EXAM: CT HEAD WO CONT,       EXAM: CT MAXILLOFACIAL WO CONT       INDICATION: fall, facial injury       COMPARISON: CT head 12/11/2022       CONTRAST:   None. TECHNIQUE:  Multislice helical CT of the head and facial bones was performed in   the axial plane without intravenous contrast administration. Coronal and   sagittal reformations were generated. CT dose reduction was achieved through   use of a standardized protocol tailored for this examination and automatic   exposure control for dose modulation. FINDINGS:       HEAD:   Generalized volume loss. Scattered white matter hypodensities may reflect   chronic microangiopathic change. There is no intracranial hemorrhage,   extra-axial collection, or mass effect. The basilar cisterns are open. No CT   evidence of acute infarct. MAXILLOFACIAL:   Bones: Age-indeterminate fracture at the anterior aspect of the right maxillary   alveolar process with malpositioning of the associated tooth with periapical   lucency (2-35, 6-15). Chronic bilateral nasal bone fracture deformities. Paranasal sinuses: Clear   Orbits: The globes, optic nerves, and extraocular muscles are within normal   limits. Soft tissues: Left frontal scalp laceration with skin staples. Miscellaneous: N/A            12/15/22 1819  CT SPINE CERV WO CONT Final result    Impression:      1. No acute abnormality. 2. Osseous fusion from C5 to C7.   3. Unchanged multilevel spondylosis. Narrative:  EXAM:  CT CERVICAL SPINE WITHOUT CONTRAST       INDICATION: fall, head injury. COMPARISON: 3/14/2022       CONTRAST:  None. TECHNIQUE: Multislice helical CT of the cervical spine was performed without   intravenous contrast administration.   Sagittal and coronal reformats were generated. CT dose reduction was achieved through use of a standardized   protocol tailored for this examination and automatic exposure control for dose   modulation. FINDINGS:       There is osseous fusion from C5 to C7. There is mild reversal of curvature of   the cervical spine centered at C6. There is grade 1 anterolisthesis of C7 on T1   measuring 2 mm. There is no fracture or compression deformity. The odontoid   process is intact. The craniocervical junction is within normal limits. The incidentally imaged soft tissues are within normal limits. C2-C3: No significant spinal stenosis. Mild left foraminal narrowing. [place] C3-C4: Mild broad-based disc osteophyte complex causing mild spinal stenosis. No   significant neural foraminal narrowing. [place] C4-C5: Mild broad-based disc osteophyte complex that is asymmetric to the right. There is mild spinal stenosis with greater impingement right lateral recess. There is severe right neural foraminal narrowing. There is mild left neural   foraminal narrowing. [place] C5-C6: Osseous fusion. Mild bilateral neural foraminal narrowing. [place] C6-C7: Osseous fusion. Mild right neural foraminal narrowing. .       C7-T1: No significant spinal stenosis. Mild-to-moderate bilateral neural   foraminal narrowing. .                 CXR Results  (Last 48 hours)      None              Medical Decision Making   I am the first provider for this patient. I reviewed the vital signs, available nursing notes, past medical history, past surgical history, family history and social history. Vital Signs-Reviewed the patient's vital signs.   Patient Vitals for the past 12 hrs:   Pulse Resp   12/15/22 1928 74 15         Cardiac Monitor:   Rate: 72bpm  Rhythm: Normal Sinus Rhythm      EKG interpretation: (Preliminary)  EK/15/2022 @ 1743  Rate 72, normal sinus rhythm,No Ectopy, left bundle branch AV Block, No Acute ST changes has had previous left bundle branch block  This EKG was interpreted by ED Provider Latisha Coker MD      Records Reviewed: Nursing Notes, Old Medical Records, and Previous electrocardiograms    Provider Notes (Medical Decision Making):   MDM:     Has age-indeterminate nasal bone and dental alveolar process fracture. No evidence of intracranial hemorrhage or contusion. Patient is alert and apparently at baseline. Will discharge back for monitoring at [address] [place] [address]. ED Course:   Initial assessment performed. The patients presenting problems have been discussed, and they are in agreement with the care plan formulated and outlined with them. I have encouraged them to ask questions as they arise throughout their visit. ED Course as of 12/16/22 0725   Thu Dec 15, 2022   1916 Pt signed out to me by Dr. Fredis Pratt. CT shows  IMPRESSION  1.  Age-indeterminate fracture at the anterior aspect of the right maxillary  alveolar process with malpositioning of the associated tooth (? Second premolar)  with periapical lucency (2-35, 6-15). 2.  Chronic bilateral nasal bone fracture deformities. 3.  No acute intracranial abnormality. Left-sided scalp laceration. Will discharge home on cephalexin 500 TID. Daughter in law coming to pick her up. [VG]      ED Course User Index  [VG] Rupal Duncan MD        Discharge note:    Pt re-evaluated and noted to be able, ready for discharge. Results and symptoms indicating need for reevaluation sent to [address] [place] [address] via discharge instructions. Vital signs stable for discharge. Critical Care Time:   0 minutes      Diagnosis     Clinical Impression:   1. Fall at nursing home, initial encounter    2. Contusion of nose, initial encounter    3. Closed fracture of right side of maxilla, initial encounter ([facility] [address]        PLAN:  1.    Discharge Medication List as of 12/15/2022  7:19 PM        START taking these medications    Details   cephALEXin (Keflex) 500 mg capsule Take 1 Capsule by mouth three (3) times daily for 7 days. , Normal, Disp-21 Capsule, R-0           CONTINUE these medications which have NOT CHANGED    Details   escitalopram oxalate (LEXAPRO) 10 mg tablet Take 10 mg by mouth daily. , Historical Med      LORazepam (ATIVAN) 1 mg tablet Take  by mouth every four (4) hours as needed for Anxiety. , Historical Med      rosuvastatin (CRESTOR) 20 mg tablet Take 20 mg by mouth nightly., Historical Med      memantine (NAMENDA) 10 mg tablet Take  by mouth two (2) times a day., Historical Med      HYDROcodone-acetaminophen (NORCO) 5-325 mg per tablet Take 0.5-1 Tabs by mouth every six (6) hours as needed for Pain. Max Daily Amount: 4 Tabs. Take in addition to Meloxicam for severe pain flares, Print, Disp-20 Tab, R-0      meloxicam (MOBIC) 15 mg tablet Take 1 Tab by mouth daily. Take with first meal of the day. Indications: OSTEOARTHRITIS, Normal, Disp-30 Tab, R-0      ergocalciferol (VITAMIN D2) 50,000 unit capsule Take 50,000 Units by mouth every seven (7) days. , Historical Med      diazepam (VALIUM) 5 mg tablet Take 1 tablet by mouth three (3) times daily as needed for Anxiety (spasm). , Print, Disp-20 tablet, R-0      atorvastatin (LIPITOR) 40 mg tablet Take  by mouth daily. , Historical Med      metformin (GLUCOPHAGE) 500 mg tablet Take 1 Tab by mouth two (2) times daily (with meals). Take  by mouth two (2) times daily (with meals). Do not restart for 2 days, Print, Disp-30 Tab, R-0      aspirin 81 mg chewable tablet Take 162 mg by mouth daily. , Historical Med      verapamil (CALAN) 120 mg tablet Take 1 Tab by mouth daily. , No Print, Disp-90 Tab, R-0      levothyroxine (SYNTHROID) 50 mcg tablet Take  by mouth daily (before breakfast). , Historical Med           2.    Follow-up Information       Follow up With Specialties Details Why Contact Info    Ashlyn Alba NP Nurse Practitioner In 2 days For reevaluation [facility] [phone] [id number] [facility] [id number] [phone] Return to ED if worse     Disposition:  Transfer patient back to [address] [place] [address]      Please note, this dictation was completed with [employer], the computer voice recognition software. Quite often unanticipated grammatical, syntax, homophones, and other interpretive errors are inadvertently transcribed by the computer software. Please disregard these errors. Please excuse any errors that have escaped final proof reading.

## 2022-12-16 NOTE — ED NOTES
I have reviewed discharge instructions with the family member (son). The son verbalized understanding.

## 2022-12-16 NOTE — ED NOTES
Charge nurse to [place] nurse  report given, report included pt awaits radiology results and disp

## 2022-12-31 ENCOUNTER — HOSPITAL ENCOUNTER (EMERGENCY)
Age: 72
Discharge: HOME | End: 2023-01-01
Attending: FAMILY MEDICINE
Payer: MEDICARE

## 2022-12-31 ENCOUNTER — APPOINTMENT (OUTPATIENT)
Dept: CT IMAGING | Age: 72
End: 2022-12-31
Attending: FAMILY MEDICINE
Payer: MEDICARE

## 2022-12-31 DIAGNOSIS — S01.01XA LACERATION OF SCALP, INITIAL ENCOUNTER: Primary | ICD-10-CM

## 2022-12-31 PROCEDURE — 70450 CT HEAD/BRAIN W/O DYE: CPT

## 2022-12-31 PROCEDURE — 99284 EMERGENCY DEPT VISIT MOD MDM: CPT

## 2022-12-31 PROCEDURE — 72125 CT NECK SPINE W/O DYE: CPT

## 2023-01-01 VITALS
TEMPERATURE: 98.1 F | RESPIRATION RATE: 17 BRPM | WEIGHT: 150 LBS | HEART RATE: 88 BPM | SYSTOLIC BLOOD PRESSURE: 118 MMHG | BODY MASS INDEX: 26.57 KG/M2 | OXYGEN SATURATION: 96 % | DIASTOLIC BLOOD PRESSURE: 76 MMHG

## 2023-01-01 NOTE — PROGRESS NOTES
Contacted [facility] to arrange BLS transport of patient back to [facility]. Spoke with Beti Rios. Discussed patient condition, and need for transport. ETA is 0100. ED is aware.

## 2023-01-02 NOTE — ED PROVIDER NOTES
EMERGENCY DEPARTMENT HISTORY AND PHYSICAL EXAM          Date: 12/31/2022  Patient Name: Cara Merchant    History of Presenting Illness     Chief Complaint   Patient presents with   Bernestine Herbert Fall       History Provided By: EMS, Patient's son    HPI: Cara Merchant is a 71 y.o. female, pmhx , who was brought to the ED by EMS because of an unwitnessed fall out of bed. She was noted by staff to have a laceration on the postauricular aspect of her scalp. No other injuries noted. Her behavior is at baseline. She has had several visits to the ED in the last several weeks for similar events. No fevers/chills that the nursing home has seen. PCP: Bella Yee NP    Allergies: Tetanus  Social Hx: No tobacco, vaping, EtOH, Illicit Drugs; Lives in local nursing home. There are no other complaints, changes, or physical findings at this time. Current Outpatient Medications   Medication Sig Dispense Refill    escitalopram oxalate (LEXAPRO) 10 mg tablet Take 10 mg by mouth daily.  LORazepam (ATIVAN) 1 mg tablet Take  by mouth every four (4) hours as needed for Anxiety.  rosuvastatin (CRESTOR) 20 mg tablet Take 20 mg by mouth nightly.  memantine (NAMENDA) 10 mg tablet Take  by mouth two (2) times a day.  HYDROcodone-acetaminophen (NORCO) 5-325 mg per tablet Take 0.5-1 Tabs by mouth every six (6) hours as needed for Pain. Max Daily Amount: 4 Tabs. Take in addition to Meloxicam for severe pain flares 20 Tab 0    meloxicam (MOBIC) 15 mg tablet Take 1 Tab by mouth daily. Take with first meal of the day. Indications: OSTEOARTHRITIS 30 Tab 0    ergocalciferol (VITAMIN D2) 50,000 unit capsule Take 50,000 Units by mouth every seven (7) days.  diazepam (VALIUM) 5 mg tablet Take 1 tablet by mouth three (3) times daily as needed for Anxiety (spasm). 20 tablet 0    atorvastatin (LIPITOR) 40 mg tablet Take  by mouth daily.       metformin (GLUCOPHAGE) 500 mg tablet Take 1 Tab by mouth two (2) times daily (with meals). Take  by mouth two (2) times daily (with meals). Do not restart for 2 days 30 Tab 0    aspirin 81 mg chewable tablet Take 162 mg by mouth daily.  verapamil (CALAN) 120 mg tablet Take 1 Tab by mouth daily. 90 Tab 0    levothyroxine (SYNTHROID) 50 mcg tablet Take  by mouth daily (before breakfast). Past History     Past Medical History:  Past Medical History:   Diagnosis Date    [facility]     Colon polyp     Depression     [facility]     Neuropathy    Hyperlipemia     Hypertension     Hypothyroidism     Morbid obesity with BMI of 40.0-44.9, adult ([facility])        Past Surgical History:  Past Surgical History:   Procedure Laterality Date    HX APPENDECTOMY      HX GYN      Hysterectomy, BTL    HX HEENT      nasal  surgery    HX ORTHOPAEDIC      neck surgery, foot surgery, knee and shoulder surgery    MS ABDOMEN SURGERY PROC UNLISTED         Family History:  Family History   Problem Relation Age of Onset    Heart Disease Mother        Social History:  Social History     Tobacco Use    Smoking status: Never    Smokeless tobacco: Never   Substance Use Topics    Alcohol use: No    Drug use: No       Allergies: Allergies   Allergen Reactions    Tetanus Vaccines And Toxoid Unknown (comments)         Review of Systems   Review of Systems   Unable to perform ROS: Dementia       Physical Exam   Physical Exam  Vitals reviewed. Constitutional:       General: She is not in acute distress. Appearance: She is well-developed. She is not diaphoretic. HENT:      Head: Normocephalic. Comments: Left temporal area: 7 mm superficial laceration/abrasion with a small amount of dried blood. No swelling or active bleeding. Right Ear: External ear normal.      Left Ear: External ear normal.      Nose: Nose normal.      Mouth/Throat:      Mouth: Mucous membranes are moist.   Eyes:      General: No scleral icterus. Right eye: No discharge. Left eye: No discharge. Conjunctiva/sclera: Conjunctivae normal.      Pupils: Pupils are equal, round, and reactive to light. Neck:      Thyroid: No thyromegaly. Trachea: No tracheal deviation. Cardiovascular:      Rate and Rhythm: Normal rate and regular rhythm. Heart sounds: Normal heart sounds. No murmur heard. No friction rub. No gallop. Pulmonary:      Effort: Pulmonary effort is normal. No respiratory distress. Breath sounds: Normal breath sounds. No wheezing or rales. Chest:      Chest wall: No tenderness. Abdominal:      General: Bowel sounds are normal. There is no distension. Palpations: Abdomen is soft. Tenderness: There is no abdominal tenderness. There is no guarding or rebound. Musculoskeletal:         General: No tenderness or deformity. Normal range of motion. Cervical back: Normal range of motion and neck supple. Skin:     General: Skin is warm and dry. Neurological:      Mental Status: She is alert. Mental status is at baseline. Deep Tendon Reflexes: Reflexes are normal and symmetric. Comments: Lies still, eyes closed. Withdraws from painful stimuli. Diagnostic Study Results     Labs -   No results found for this or any previous visit (from the past 12 hour(s)). Radiologic Studies -   CT HEAD WO CONT   Final Result   No acute intracranial process. Small chronic right frontal subdural hematoma,   with no mass effect. Left sphenoid sinus disease. CT SPINE CERV WO CONT   Final Result   No acute fracture or subluxation. CT Results  (Last 48 hours)                 12/31/22 5161  CT HEAD WO CONT Final result    Impression:  No acute intracranial process. Small chronic right frontal subdural hematoma,   with no mass effect. Left sphenoid sinus disease. Narrative:  EXAM: CT HEAD WO CONT       INDICATION: unwitnessed fall       COMPARISON: December 15, 2022. CONTRAST: None.        TECHNIQUE: Unenhanced CT of the head was performed using 5 mm images. Brain and   bone windows were generated. Coronal and sagittal reformats. CT dose reduction   was achieved through use of a standardized protocol tailored for this   examination and automatic exposure control for dose modulation. FINDINGS:   The ventricles and sulci are stable in size, shape and configuration. There is   no significant white matter disease. Small chronic right frontal subdural   hematoma is present. There is no intracranial hemorrhage, or mass effect. The   basilar cisterns are open. No CT evidence of acute infarct. The bone windows demonstrate no abnormalities. There is mucoperiosteal   thickening in the left sphenoid sinus The visualized portions of the paranasal   sinuses and mastoid air cells are otherwise clear. 12/31/22 2329  CT SPINE CERV WO CONT Final result    Impression:  No acute fracture or subluxation. Narrative:  EXAM:  CT CERVICAL SPINE WITHOUT CONTRAST       INDICATION: unwitnessed fall. COMPARISON: None. CONTRAST:  None. TECHNIQUE: Multislice helical CT of the cervical spine was performed without   intravenous contrast administration. Sagittal and coronal reformats were   generated. CT dose reduction was achieved through use of a standardized   protocol tailored for this examination and automatic exposure control for dose   modulation. FINDINGS:       The alignment is within normal limits. There is no fracture or compression   deformity. The odontoid process is intact. The craniocervical junction is within   normal limits. The patient is status post C5-C7 vertebral body fusion. Mild   degenerative disc disease is present C4-5 and C7-T1. Bilateral facet arthropathy   is seen throughout the cervical spine. The incidentally imaged soft tissues are within normal limits.                  CXR Results  (Last 48 hours)      None              Medical Decision Making   I am the first provider for this patient. I reviewed the vital signs, available nursing notes, past medical history, past surgical history, family history and social history. Vital Signs-Reviewed the patient's vital signs. No data found. Pulse Oximetry Analysis - 96% on RA      Records Reviewed: Nursing Notes and Old Medical Records    Provider Notes (Medical Decision Making):   MDM: Recurrent visit for this woman who resides at local nursing home. Very small laceration/abrasion to temporal area. CT of head, cervical spine done, negative. Son mentioned UTI, and cath UA offered but declined. Vital signs normal so do not feel that it would add to her quality of life. ED Course:   Initial assessment performed. The patients presenting problems have been discussed, and they are in agreement with the care plan formulated and outlined with them. I have encouraged them to ask questions as they arise throughout their visit. PROGRESS NOTE:  Pt stable during her time in the ED. Her wound was cleansed and son allowed to bedside. Discussion about the multiple visits to the ED; no easy answers. Discharge note:  Pt re-evaluated and noted to be feeling better , ready for discharge. Updated pt  on all final lab  findings. Will follow up as instructed . All questions have been answered, pt voiced understanding and agreement with plan. Specific return precautions provided as well as instructions to return to the ED should sx worsen at any time. Vital signs stable for discharge. Critical Care Time: 0      Diagnosis     Clinical Impression:   1. Laceration of scalp, initial encounter        PLAN:  1. Discharge Medication List as of 1/1/2023  1:01 AM        2.    Follow-up Information       Follow up With Specialties Details Why Contact Ilan Ramirez NP Nurse Practitioner   [address]  [place] [phone]            Return to ED if worse Disposition:  Home       Please note, this dictation was completed with [email or web address], the computer voice recognition software. Quite often unanticipated grammatical, syntax, homophones, and other interpretive errors are inadvertently transcribed by the computer software. Please disregard these errors. Please excuse any errors that have escaped final proof reading.

## 2023-01-02 NOTE — PROGRESS NOTES
ED Notes faxed to [facility], [address] [place] [address], [phone].

## 2023-02-23 ENCOUNTER — HOSPITAL ENCOUNTER (EMERGENCY)
Age: 73
Discharge: HOME | End: 2023-02-24
Attending: EMERGENCY MEDICINE
Payer: MEDICARE

## 2023-02-23 ENCOUNTER — APPOINTMENT (OUTPATIENT)
Dept: CT IMAGING | Age: 73
End: 2023-02-23
Attending: EMERGENCY MEDICINE
Payer: MEDICARE

## 2023-02-23 DIAGNOSIS — S09.90XA INJURY OF HEAD, INITIAL ENCOUNTER: Primary | ICD-10-CM

## 2023-02-23 PROCEDURE — 99284 EMERGENCY DEPT VISIT MOD MDM: CPT

## 2023-02-23 PROCEDURE — 70450 CT HEAD/BRAIN W/O DYE: CPT

## 2023-02-23 PROCEDURE — 72125 CT NECK SPINE W/O DYE: CPT

## 2023-02-23 PROCEDURE — 75810000293 HC SIMP/SUPERF WND  RPR

## 2023-02-23 NOTE — ED NOTES
Called [facility] to arrange a BLS transport back to the [facility]. Spoke with Meg Banks. ETA of 0100.

## 2023-02-23 NOTE — ED NOTES
This writer tried to call [facility] in order to facilitate transport for the patient, the facility did not answer. Nursing Supervisor aware of the need for [facility] to transport this patient home.

## 2023-02-23 NOTE — ED PROVIDER NOTES
EMERGENCY DEPARTMENT HISTORY AND PHYSICAL EXAM          Date: 2/23/2023  Patient Name: Nely Bae    History of Presenting Illness     Chief Complaint   Patient presents with    Fall       History Provided By: EMS, Nursing Home    HPI: Nely Bae is a 71 y.o. female, pmhx listed below, who presents to the ED c/o fall. Patient fell while in the bathroom, hit her head on the back of the toilet. Report of mechanical fall, no LOC witnessed. Nursing home staff noted bleeding from the back of her head. EMS arrived and placed patient in c-collar. Patient has dementia, unable to provide any history on her own. PCP: Courtney Taylor NP        Past History       Past Medical History:  Past Medical History:   Diagnosis Date    Atrial fibrillation ([facility]     Colon polyp     Depression     Diabetes ([facility]     Neuropathy    Hyperlipemia     Hypertension     Hypothyroidism     Morbid obesity with BMI of 40.0-44.9, adult [facility])        Past Surgical History:  Past Surgical History:   Procedure Laterality Date    HX APPENDECTOMY      HX GYN      Hysterectomy, BTL    HX HEENT      nasal  surgery    HX ORTHOPAEDIC      neck surgery, foot surgery, knee and shoulder surgery    TX UNLISTED PROCEDURE ABDOMEN PERITONEUM & OMENTUM         Family History:  Family History   Problem Relation Age of Onset    Heart Disease Mother        Social History:  Social History     Tobacco Use    Smoking status: Never    Smokeless tobacco: Never   Substance Use Topics    Alcohol use: No    Drug use: No       Physical Exam     Vital Signs-Reviewed the patient's vital signs. No data found. Physical Exam  HENT:      Head:      Comments: 6 cm left ulceration to left occiput     Mouth/Throat:      Mouth: Mucous membranes are moist.   Eyes:      Pupils: Pupils are equal, round, and reactive to light. Cardiovascular:      Rate and Rhythm: Normal rate and regular rhythm.    Pulmonary:      Effort: Pulmonary effort is normal. Breath sounds: Normal breath sounds. Abdominal:      Tenderness: There is no abdominal tenderness. Musculoskeletal:         General: No swelling or tenderness. Comments: All extremities nontender   Skin:     General: Skin is warm and dry. Neurological:      Mental Status: She is alert. Comments: Patient responds to questions but is oriented x0. Recognizes her son. Psychiatric:         Mood and Affect: Mood normal.       Diagnostic Study Results     Labs -   No results found for this or any previous visit (from the past 12 hour(s)). Radiologic Studies -   CT HEAD WO CONT   Final Result   No acute intracranial abnormality. CT SPINE CERV WO CONT   Final Result   No acute bony abnormality of cervical spine. Stable degenerative change. CT Results  (Last 48 hours)                 02/23/23 1640  CT HEAD WO CONT Final result    Impression:  No acute intracranial abnormality. Narrative:  EXAM: CT HEAD WO CONT       INDICATION: s/p head injury       COMPARISON: None. CONTRAST: None. TECHNIQUE: Unenhanced CT of the head was performed using 5 mm images. Brain and   bone windows were generated. Coronal and sagittal reformats. CT dose reduction   was achieved through use of a standardized protocol tailored for this   examination and automatic exposure control for dose modulation. FINDINGS:   The ventricles and sulci are normal in size, shape and configuration. . There is   no significant white matter disease. There is no intracranial hemorrhage,   extra-axial collection, or mass effect. The basilar cisterns are open. No CT   evidence of acute infarct. The bone windows demonstrate no abnormalities. The visualized portions of the   paranasal sinuses and mastoid air cells are clear. 02/23/23 1640  CT SPINE CERV WO CONT Final result    Impression:  No acute bony abnormality of cervical spine. Stable degenerative change.        Narrative:  EXAM: CT CERVICAL SPINE WITHOUT CONTRAST       INDICATION: s/p head and neck injury. Neck pain       COMPARISON: 12/31/2022       CONTRAST:  None. TECHNIQUE: Multislice helical CT of the cervical spine was performed without   intravenous contrast administration. Sagittal and coronal reformats were   generated. CT dose reduction was achieved through use of a standardized   protocol tailored for this examination and automatic exposure control for dose   modulation. FINDINGS:       Alignment is remarkable for stable loss of lordosis, with mild kyphosis from   C5-C7 where there is interbody fusion. This is stable. There is no fracture or   compression deformity. The odontoid process is intact. The craniocervical   junction is within normal limits. The incidentally imaged soft tissues are within normal limits. Multilevel degenerative change is stable with multilevel central and foraminal   stenosis. CXR Results  (Last 48 hours)      None                Medical Decision Making   I am the first provider for this patient. I reviewed the vital signs, available nursing notes, past medical history, past surgical history, family history and social history. Records Reviewed: Prior medical records    Provider Notes (Medical Decision Making):   MDM: 67-year-old female with head injury. Dementia at baseline, unable to provide any history. We will plan for CT head and C-spine. Will require laceration repair. Initial assessment performed. The patients presenting problems have been discussed, and they are in agreement with the care plan formulated and outlined with them. I have encouraged them to ask questions as they arise throughout their visit. PROGRESS NOTE:  Staples placed. We will send back to [facility]. Discharge note:  Family updated pt on all final results. Will follow up as instructed.  All questions have been answered, pt voiced understanding and agreement with plan. Specific return precautions provided as well as instructions to return to the ED should sx worsen at any time. Vital signs stable for discharge. Wound Repair    Date/Time: 2/24/2023 7:03 AM  Performed by: attendingPreparation: skin prepped with ChloraPrep  Location details: scalp  Wound length:2.6 - 7.5 cm    Anesthesia:  Local Anesthetic: lidocaine 1% with epinephrine  Foreign bodies: no foreign bodies  Skin closure: staples  Number of sutures: 8  Approximation: close  My total time at bedside, performing this procedure was 1-15 minutes. Diagnosis     Clinical Impression:   1. Injury of head, initial encounter            Disposition:  Discharged    Discharge Medication List as of 2/24/2023  2:39 AM            Please note, this dictation was completed with inMotionNow, the computer voice recognition software. Quite often unanticipated grammatical, syntax, homophones, and other interpretive errors are inadvertently transcribed by the computer software. Please disregard these errors. Please excuse any errors that have escaped final proof reading.

## 2023-02-23 NOTE — ED NOTES
Pt laceration cleansed as pt would tolerate. Staples (8) placed by MD. Personal hygiene provided for BM and clean linens applied.

## 2023-02-23 NOTE — ED TRIAGE NOTES
Pt arrived by EMS from the nursing home after a fall. Pt had a ground level fall and hit the back of her head, bleeding controlled at this time. Pt has dementia and EMS not able to get a baseline. Pt is not on blood thinners. IV established by EMS PTA. Pt arrived on back board c-collar. Pt educated on ER flow. Please note EMS was notified via HEAR report that they will be in the hallway with this patient. Patient and/or Family notified of acuity of the unit and on going construction. This writer apologized for any delay that may occur.

## 2023-02-24 VITALS
RESPIRATION RATE: 18 BRPM | DIASTOLIC BLOOD PRESSURE: 60 MMHG | TEMPERATURE: 96.4 F | OXYGEN SATURATION: 100 % | HEART RATE: 75 BPM | SYSTOLIC BLOOD PRESSURE: 116 MMHG

## 2023-02-24 PROCEDURE — 75810000293 HC SIMP/SUPERF WND  RPR

## 2023-02-24 NOTE — ED NOTES
Charge nurse to Celia nurse  report given:  Report included pt await for [facility] to go back to [facility]

## 2023-05-25 ENCOUNTER — APPOINTMENT (OUTPATIENT)
Facility: HOSPITAL | Age: 73
End: 2023-05-25
Payer: MEDICARE

## 2023-05-25 ENCOUNTER — HOSPITAL ENCOUNTER (EMERGENCY)
Facility: HOSPITAL | Age: 73
Discharge: HOME | End: 2023-05-26
Attending: EMERGENCY MEDICINE
Payer: MEDICARE

## 2023-05-25 DIAGNOSIS — S02.2XXA CLOSED FRACTURE OF NASAL BONE, INITIAL ENCOUNTER: ICD-10-CM

## 2023-05-25 DIAGNOSIS — S09.90XA CLOSED HEAD INJURY, INITIAL ENCOUNTER: Primary | ICD-10-CM

## 2023-05-25 PROCEDURE — 6370000000 HC RX 637 (ALT 250 FOR IP): Performed by: EMERGENCY MEDICINE

## 2023-05-25 PROCEDURE — 70450 CT HEAD/BRAIN W/O DYE: CPT

## 2023-05-25 PROCEDURE — 99284 EMERGENCY DEPT VISIT MOD MDM: CPT

## 2023-05-25 PROCEDURE — 70486 CT MAXILLOFACIAL W/O DYE: CPT

## 2023-05-25 PROCEDURE — 73521 X-RAY EXAM HIPS BI 2 VIEWS: CPT

## 2023-05-25 RX ORDER — DIAZEPAM 5 MG/1
5 TABLET ORAL ONCE
Status: COMPLETED | OUTPATIENT
Start: 2023-05-25 | End: 2023-05-25

## 2023-05-25 RX ADMIN — DIAZEPAM 5 MG: 5 TABLET ORAL at 19:00

## 2023-05-25 ASSESSMENT — PAIN - FUNCTIONAL ASSESSMENT
PAIN_FUNCTIONAL_ASSESSMENT: WONG-BAKER FACES
PAIN_FUNCTIONAL_ASSESSMENT: 0-10
PAIN_FUNCTIONAL_ASSESSMENT: PAIN ASSESSMENT IN ADVANCED DEMENTIA (PAINAD)

## 2023-05-25 ASSESSMENT — PAIN SCALES - PAIN ASSESSMENT IN ADVANCED DEMENTIA (PAINAD)
CONSOLABILITY: 0
TOTALSCORE: 0
NEGVOCALIZATION: 0
FACIALEXPRESSION: 0
BODYLANGUAGE: 0
BREATHING: 0

## 2023-05-25 ASSESSMENT — PAIN SCALES - GENERAL: PAINLEVEL_OUTOF10: 0

## 2023-05-25 ASSESSMENT — LIFESTYLE VARIABLES
HOW MANY STANDARD DRINKS CONTAINING ALCOHOL DO YOU HAVE ON A TYPICAL DAY: PATIENT DOES NOT DRINK
HOW OFTEN DO YOU HAVE A DRINK CONTAINING ALCOHOL: NEVER

## 2023-05-25 ASSESSMENT — PAIN DESCRIPTION - PAIN TYPE: TYPE: ACUTE PAIN

## 2023-05-25 ASSESSMENT — PAIN SCALES - WONG BAKER: WONGBAKER_NUMERICALRESPONSE: 2

## 2023-05-25 NOTE — ED NOTES
[facility]  05/25/23 9930

## 2023-05-25 NOTE — ED NOTES
Found by staff up in room out of bed despite the side rails. Bed alarm started. More alert and active.  Given applesauce and some water     Venus Reyna, [id number]  05/25/23 [address], RN  05/25/23 6602

## 2023-05-25 NOTE — ED TRIAGE NOTES
Pt presents via EMS from [facility] for a unwitnessed GLF 20 minutes PTA. Pt is baseline dementia and a hospice patient. Nursing facility reports nasal injury and bleeding from the right nare. Unknown LOC.

## 2023-05-25 NOTE — ED NOTES
Pt ambulated out of the bed independently and around the room. Pt was assisted back into the bed.      Fransico Cespedes RN  05/25/23 9443

## 2023-05-26 VITALS
OXYGEN SATURATION: 97 % | SYSTOLIC BLOOD PRESSURE: 118 MMHG | BODY MASS INDEX: 31.18 KG/M2 | DIASTOLIC BLOOD PRESSURE: 83 MMHG | TEMPERATURE: 98 F | HEART RATE: 68 BPM | RESPIRATION RATE: 18 BRPM | WEIGHT: 176 LBS

## 2023-05-26 ASSESSMENT — PATIENT HEALTH QUESTIONNAIRE - PHQ9: SUM OF ALL RESPONSES TO PHQ QUESTIONS 1-9: 0

## 2023-05-26 NOTE — ED NOTES
This writer spoke to Mau and requested, due to the current ETA that [facility] attempt to provide transportation back to the facility this morning.  Mau stated that she will let her manager know of the request.      Yajaira Pinzon RN  05/26/23 1475

## 2023-05-26 NOTE — ED NOTES
Pt continues to wait for transport, ETA tentatively [address] [place] [address] , RN  05/26/23 2517

## 2023-05-26 NOTE — ED NOTES
[facility] here to transport pt home [facility].    Report given to Enma Peña EMT with [facility]     Mor Johnston RN  05/26/23 5311

## 2023-09-20 ENCOUNTER — HOSPITAL ENCOUNTER (EMERGENCY)
Facility: HOSPITAL | Age: 73
Discharge: HOME | End: 2023-09-20
Attending: EMERGENCY MEDICINE
Payer: MEDICARE

## 2023-09-20 ENCOUNTER — APPOINTMENT (OUTPATIENT)
Facility: HOSPITAL | Age: 73
End: 2023-09-20
Payer: MEDICARE

## 2023-09-20 VITALS
SYSTOLIC BLOOD PRESSURE: 118 MMHG | RESPIRATION RATE: 20 BRPM | HEIGHT: 63 IN | BODY MASS INDEX: 30.12 KG/M2 | WEIGHT: 170 LBS | DIASTOLIC BLOOD PRESSURE: 80 MMHG | HEART RATE: 67 BPM | TEMPERATURE: 98.1 F | OXYGEN SATURATION: 98 %
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
## 2023-09-20 PROCEDURE — 70450 CT HEAD/BRAIN W/O DYE: CPT

## 2023-09-20 PROCEDURE — 72170 X-RAY EXAM OF PELVIS: CPT

## 2023-09-20 PROCEDURE — 99284 EMERGENCY DEPT VISIT MOD MDM: CPT

## 2023-09-20 ASSESSMENT — PAIN SCALES - GENERAL: PAINLEVEL_OUTOF10: 0

## 2023-09-20 ASSESSMENT — PAIN - FUNCTIONAL ASSESSMENT: PAIN_FUNCTIONAL_ASSESSMENT: 0-10

## 2023-09-20 NOTE — ED TRIAGE NOTES
Pt arrived by EMS from [facility]. Pt is on the memory care unit and had a witnessed fall. EMS ALS responded and pt was able to get up off the floor and initial assessment showed no visible injuries or deformities. Pt denies pain and Hospice nurse reported pt did not hit her head and No Loc was noted. Pt is on Hospice and was sent to the hospital for evaluation. Pt is at baseline  awake and alert oriented to name only. Pt moving all extremities.   Pt educated on ER flow

## 2023-09-20 NOTE — ED NOTES
RN Supervisor aware of need for pt transport back to assisted living facility.      Isabel Moore RN  94/26/48 9511

## 2023-09-20 NOTE — ED NOTES
Nursing supp notified pt will need transportation back to [facility]     Ruth Sawyer  09/20/23 8077

## 2023-09-20 NOTE — ED NOTES
Attempt to call and give report to [address] [place] [address] on pt to return/discharged from ED. TRANSFER - OUT REPORT:    Verbal report given to [address] [place] [address] on Karly Quintero  being transferred to [facility] for routine progression of patient care       Report consisted of patient's Situation, Background, Assessment and   Recommendations(SBAR). Information from the following report(s) Nurse Handoff Report, ED SBAR, Recent Results, and Quality Measures was reviewed with the receiving nurse. [place] Fall Assessment:    Presents to emergency department  because of falls (Syncope, seizure, or loss of consciousness): Yes  Age > 79: Yes  Altered Mental Status, Intoxication with alcohol or substance confusion (Disorientation, impaired judgment, poor safety awaremess, or inability to follow instructions): Yes  Impaired Mobility: Ambulates or transfers with assistive devices or assistance; Unable to ambulate or transer.: Yes  Nursing Judgement: Yes          Lines:       Opportunity for questions and clarification was provided.       Patient to be transported with:  [facility] Staff after arrival           Cher Cruz RN  89/74/23 1615

## 2023-09-20 NOTE — ED NOTES
Pt transported to CT scan with Rad Tech by Hector Lambert at this time.         Linnette Patel RN  09/01/99 4110

## 2023-11-08 ENCOUNTER — APPOINTMENT (OUTPATIENT)
Facility: HOSPITAL | Age: 73
End: 2023-11-08
Payer: MEDICARE

## 2023-11-08 ENCOUNTER — HOSPITAL ENCOUNTER (EMERGENCY)
Facility: HOSPITAL | Age: 73
Discharge: HOME | End: 2023-11-08
Attending: EMERGENCY MEDICINE
Payer: MEDICARE

## 2023-11-08 VITALS
RESPIRATION RATE: 18 BRPM | DIASTOLIC BLOOD PRESSURE: 78 MMHG | TEMPERATURE: 98 F | OXYGEN SATURATION: 97 % | SYSTOLIC BLOOD PRESSURE: 128 MMHG | HEART RATE: 55 BPM | BODY MASS INDEX: 29.94 KG/M2 | WEIGHT: 169 LBS

## 2023-11-08 DIAGNOSIS — S00.83XA CONTUSION OF FACE, INITIAL ENCOUNTER: ICD-10-CM
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
## 2023-11-08 PROCEDURE — 72125 CT NECK SPINE W/O DYE: CPT

## 2023-11-08 PROCEDURE — 99284 EMERGENCY DEPT VISIT MOD MDM: CPT

## 2023-11-08 PROCEDURE — 70450 CT HEAD/BRAIN W/O DYE: CPT

## 2023-11-08 ASSESSMENT — PATIENT HEALTH QUESTIONNAIRE - PHQ9
SUM OF ALL RESPONSES TO PHQ9 QUESTIONS 1 & 2: 0
SUM OF ALL RESPONSES TO PHQ QUESTIONS 1-9: 0
1. LITTLE INTEREST OR PLEASURE IN DOING THINGS: 0
2. FEELING DOWN, DEPRESSED OR HOPELESS: 0

## 2023-11-08 ASSESSMENT — ENCOUNTER SYMPTOMS
EYE REDNESS: 0
COUGH: 0
ABDOMINAL PAIN: 0
SORE THROAT: 0
SHORTNESS OF BREATH: 0
DIARRHEA: 0
VOMITING: 0
NAUSEA: 0

## 2023-11-08 ASSESSMENT — LIFESTYLE VARIABLES
HOW MANY STANDARD DRINKS CONTAINING ALCOHOL DO YOU HAVE ON A TYPICAL DAY: PATIENT DOES NOT DRINK
HOW OFTEN DO YOU HAVE A DRINK CONTAINING ALCOHOL: NEVER
HOW OFTEN DO YOU HAVE A DRINK CONTAINING ALCOHOL: NEVER
HOW MANY STANDARD DRINKS CONTAINING ALCOHOL DO YOU HAVE ON A TYPICAL DAY: PATIENT DOES NOT DRINK

## 2023-11-08 ASSESSMENT — PAIN SCALES - PAIN ASSESSMENT IN ADVANCED DEMENTIA (PAINAD)
BODYLANGUAGE: 0
NEGVOCALIZATION: 0
CONSOLABILITY: 0
FACIALEXPRESSION: 0
TOTALSCORE: 0
BREATHING: 0

## 2023-11-08 ASSESSMENT — PAIN - FUNCTIONAL ASSESSMENT: PAIN_FUNCTIONAL_ASSESSMENT: PAIN ASSESSMENT IN ADVANCED DEMENTIA (PAINAD)

## 2023-11-08 NOTE — ED PROVIDER NOTES
EMERGENCY DEPARTMENT HISTORY AND PHYSICAL EXAM      Date: 11/8/2023  Patient Name: Meka Childress    History of Presenting Illness     Chief Complaint   Patient presents with    Fall       History Provided By: Patient    HPI: Meka Childress, 68 y.o. female with past medical history listed below, presents via private vehicle to the ED with cc of fall. Patient has a history of dementia. She is a resident at [facility]. EMS reports facility staff stated she was walking down the [address] and tripped over her shoe and fell forward face first.  She hit her head. Small abrasion noticed to upper lip. No other apparent injuries. Walked to the EMS stretcher. She is reportedly minimally verbal at baseline. Baseline mental status per facility. She is not on any anticoagulants. History and ROS are severely limited secondary to patient's baseline mental status. Patient placed in c-collar prior to arrival.      There are no other complaints, changes, or physical findings at this time. PCP: RADHA Mcintosh NP    No current facility-administered medications on file prior to encounter. Current Outpatient Medications on File Prior to Encounter   Medication Sig Dispense Refill    aspirin 81 MG chewable tablet Take 162 mg by mouth daily      atorvastatin (LIPITOR) 40 MG tablet Take by mouth daily      diazePAM (VALIUM) 5 MG tablet Take 5 mg by mouth 3 times daily as needed. ergocalciferol (ERGOCALCIFEROL) 1.25 MG (24279 UT) capsule Take 50,000 Units by mouth every 7 days      escitalopram (LEXAPRO) 10 MG tablet Take 10 mg by mouth daily      HYDROcodone-acetaminophen (NORCO) 5-325 MG per tablet Take 0.5-1 tablets by mouth every 6 hours as needed. levothyroxine (SYNTHROID) 50 MCG tablet Take by mouth every morning (before breakfast)      LORazepam (ATIVAN) 1 MG tablet Take by mouth every 4 hours as needed.       meloxicam (MOBIC) 15 MG tablet Take 15 mg by mouth daily white female   HENT:      Head: Normocephalic. Right Ear: Tympanic membrane and ear canal normal.      Left Ear: Tympanic membrane and ear canal normal.      Nose: Nose normal.      Comments: Septal hematoma. No nasal bleeding. Eyes:      Conjunctiva/sclera: Conjunctivae normal.      Pupils: Pupils are equal, round, and reactive to light. Neck:      Comments: In c-collar PTA. No midline vertebral point tenderness. No step-offs or depressions. Cardiovascular:      Rate and Rhythm: Normal rate and regular rhythm. Pulses: Normal pulses. Heart sounds: Normal heart sounds. Pulmonary:      Effort: Pulmonary effort is normal.      Breath sounds: Normal breath sounds. Abdominal:      Palpations: Abdomen is soft. Tenderness: There is no abdominal tenderness. Skin:     General: Skin is warm and dry. Findings: No rash. Neurological:      Mental Status: She is alert. Comments: Moves all extremities. Follows some simple commands. Exam limited secondary to patient's baseline mental status. Psychiatric:         Behavior: Behavior normal.               Diagnostic Study Results     Labs -   No results found for this or any previous visit (from the past 12 hour(s)). Radiologic Studies -   CT HEAD WO CONTRAST   Final Result         No acute abnormality      CT CERVICAL SPINE WO CONTRAST   Final Result   No acute bony abnormality of cervical spine. Stable degenerative changes. [unfilled]  [unfilled]      Medical Decision Making   I am the first provider for this patient. I reviewed the vital signs, available nursing notes, past medical history, past surgical history, family history and social history. Vital Signs-Reviewed the patient's vital signs. No data found. Records Reviewed: Nursing notes reviewed    Provider Notes (Medical Decision Making):   DDX: 71-year-old female with history of severe dementia with ground-level fall at local assisted living facility.   Will

## 2023-11-08 NOTE — ED TRIAGE NOTES
Pt arrived via EMS after a witnessed mechanical fall at [facility]. Pt has advanced dementia. C-Collared prior to  arrival, observed abrasion to  upper  lip.

## 2023-11-09 NOTE — ED NOTES
[facility] contacted, report given as pt is returning. [facility] on scene to  and transport back to her facility at this time.      Henry Morgan RN  23/82/66 [id number]

## 2023-11-09 NOTE — ED NOTES
Pt checked in bed, resting comfortably, no complaints expressed at this time, sleeping with eyes closed, vitals wnl.      Marilee Lopez RN  96/26/00 [id number]

## 2023-11-30 ENCOUNTER — APPOINTMENT (OUTPATIENT)
Facility: HOSPITAL | Age: 73
End: 2023-11-30
Payer: MEDICARE

## 2023-11-30 ENCOUNTER — HOSPITAL ENCOUNTER (EMERGENCY)
Facility: HOSPITAL | Age: 73
Discharge: HOME | End: 2023-11-30
Attending: EMERGENCY MEDICINE
Payer: MEDICARE

## 2023-11-30 VITALS
SYSTOLIC BLOOD PRESSURE: 117 MMHG | HEIGHT: 63 IN | BODY MASS INDEX: 31.04 KG/M2 | HEART RATE: 62 BPM | TEMPERATURE: 97.6 F | RESPIRATION RATE: 16 BRPM | WEIGHT: 175.2 LBS | OXYGEN SATURATION: 96 % | DIASTOLIC BLOOD PRESSURE: 92 MMHG

## 2023-11-30 DIAGNOSIS — S00.83XA TRAUMATIC HEMATOMA OF FOREHEAD, INITIAL ENCOUNTER: Primary | ICD-10-CM

## 2023-11-30 PROCEDURE — 72125 CT NECK SPINE W/O DYE: CPT

## 2023-11-30 PROCEDURE — 99284 EMERGENCY DEPT VISIT MOD MDM: CPT

## 2023-11-30 PROCEDURE — 70450 CT HEAD/BRAIN W/O DYE: CPT

## 2023-11-30 ASSESSMENT — LIFESTYLE VARIABLES
HOW OFTEN DO YOU HAVE A DRINK CONTAINING ALCOHOL: NEVER
HOW MANY STANDARD DRINKS CONTAINING ALCOHOL DO YOU HAVE ON A TYPICAL DAY: PATIENT DOES NOT DRINK

## 2023-11-30 ASSESSMENT — PAIN SCALES - PAIN ASSESSMENT IN ADVANCED DEMENTIA (PAINAD)
CONSOLABILITY: 0
NEGVOCALIZATION: 0
BODYLANGUAGE: 0
FACIALEXPRESSION: 0
TOTALSCORE: 0
BREATHING: 0

## 2023-11-30 ASSESSMENT — PAIN SCALES - GENERAL: PAINLEVEL_OUTOF10: 0

## 2023-11-30 ASSESSMENT — PAIN - FUNCTIONAL ASSESSMENT: PAIN_FUNCTIONAL_ASSESSMENT: 0-10

## 2023-11-30 NOTE — PROGRESS NOTES
[facility] ED RN advises to arranged BLS transportation from [facility] 4 back to [place].   Arranged BLS transport w/[facility] ([place]) - advised no isolations or precautions noted on chart - ETA 2000 given - updated ED staff w/ETA

## 2023-11-30 NOTE — ED NOTES
Attempt to call report x 2 to [facility], no response.      Davon Chaudhary RN  10/41/48 [phone]

## 2023-11-30 NOTE — ED TRIAGE NOTES
Pt arrived by EMS after a fall. EMS reports that pt is from [facility] and was wander into other residents rooms and pt had taken a blanket from another resident and while this patient was leaving the room she tripped and fell,  pt has dementia. Pt noted with a hematoma on the right side of her forehead. Pt is awake but not oriented.   Pt educated on ER flow

## 2023-12-01 NOTE — ED NOTES
[facility] has arrived to transport the pt back to [place]. Report given to [facility].      Ana Obrien RN  16/98/35 2036

## 2024-04-28 ENCOUNTER — HOSPITAL ENCOUNTER (EMERGENCY)
Facility: HOSPITAL | Age: 74
Discharge: NURSING, REHAB OR LONG-TERM CARE | End: 2024-04-28
Attending: EMERGENCY MEDICINE
Payer: MEDICARE

## 2024-04-28 ENCOUNTER — APPOINTMENT (OUTPATIENT)
Facility: HOSPITAL | Age: 74
End: 2024-04-28
Payer: MEDICARE

## 2024-04-28 VITALS
OXYGEN SATURATION: 92 % | RESPIRATION RATE: 16 BRPM | HEIGHT: 63 IN | HEART RATE: 51 BPM | WEIGHT: 170 LBS | BODY MASS INDEX: 30.12 KG/M2 | SYSTOLIC BLOOD PRESSURE: 123 MMHG | TEMPERATURE: 97.9 F | DIASTOLIC BLOOD PRESSURE: 64 MMHG
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
## 2024-04-28 PROCEDURE — 99284 EMERGENCY DEPT VISIT MOD MDM: CPT

## 2024-04-28 PROCEDURE — 70450 CT HEAD/BRAIN W/O DYE: CPT

## 2024-04-28 ASSESSMENT — PAIN SCALES - PAIN ASSESSMENT IN ADVANCED DEMENTIA (PAINAD)
FACIALEXPRESSION: SMILING OR INEXPRESSIVE
BREATHING: NORMAL
BODYLANGUAGE: RELAXED
CONSOLABILITY: NO NEED TO CONSOLE

## 2024-04-28 ASSESSMENT — PAIN - FUNCTIONAL ASSESSMENT: PAIN_FUNCTIONAL_ASSESSMENT: PAIN ASSESSMENT IN ADVANCED DEMENTIA (PAINAD)

## 2024-04-28 NOTE — ED NOTES
This writer spoke to patient Perez family (Felice) they will be here in 30 minutes to transport her back to the [facility]. .

## 2024-04-28 NOTE — ED NOTES
[facility] reports that pt has had numerous falls and MD was concerned with EKG findings. MD aware.

## 2024-04-28 NOTE — ED TRIAGE NOTES
Pt at baseline dementia, arrives after a GLF at facility. Facility was c/o right flank pain. Unable to reproduce the pain on arrival. No injuries noted, no distress.

## 2024-04-28 NOTE — ED NOTES
This writer attempted to call report to [facility] unsuccessfully.

## 2024-04-28 NOTE — ED NOTES
This writer attempted to call report, unsuccessful and left a message on the voicemail requesting a return call from [facility].

## 2024-04-28 NOTE — ED PROVIDER NOTES
[facility] EMERGENCY DEP  EMERGENCY DEPARTMENT ENCOUNTER       Pt Name: Antonina Perez  MRN: [id number]  Birthdate 1950  Date of evaluation: 4/28/2024  Provider: Bebe Wolfe MD   PCP: Karmen Fuchs APRN - JOSE  Note Started: 3:58 PM EDT 4/28/24     CHIEF COMPLAINT       Chief Complaint   Patient presents with    Fall        HISTORY OF PRESENT ILLNESS: 1 or more elements      History From: EMS, History limited by: Dementia     Antonina Perez is a 73 y.o. female who presents via EMS from her assisted living facility after an unwitnessed fall.       Please See MDM for Additional Details of the HPI/PMH  Nursing Notes were all reviewed and agreed with or any disagreements were addressed in the HPI.     REVIEW OF SYSTEMS        Positives and Pertinent negatives as per HPI.    PAST HISTORY     Past Medical History:  Past Medical History:   Diagnosis Date    Atrial fibrillation (HCC)     Colon polyp     Depression     Diabetes (HCC)     Neuropathy    Hyperlipemia     Hypertension     Hypothyroidism     Morbid obesity with BMI of 40.0-44.9, adult (HCC)        Past Surgical History:  Past Surgical History:   Procedure Laterality Date    APPENDECTOMY      GYN      Hysterectomy, BTL    HEENT      nasal  surgery    ORTHOPEDIC SURGERY      neck surgery, foot surgery, knee and shoulder surgery    [place] UNLISTED PROCEDURE ABDOMEN PERITONEUM & OMENTUM         Family History:  Family History   Problem Relation Age of Onset    Heart Disease Mother        Social History:  Social History     Tobacco Use    Smoking status: Never    Smokeless tobacco: Never   Substance Use Topics    Alcohol use: No    Drug use: No       Allergies:  Allergies   Allergen Reactions    Lisinopril Other (See Comments)     Pt unaware- listed in PCP notes    Tetanus Toxoids      Other reaction(s): Unknown (comments)       CURRENT MEDICATIONS      Previous Medications    ASPIRIN 81 MG CHEWABLE TABLET    Take 162 mg by mouth daily    ATORVASTATIN

## 2024-04-28 NOTE — PROGRESS NOTES
Contacted [facility] and spoke with Jemma to arrange BLS transport for this patient to [facility]. Requested information provided ( Dx, wt, demographics, room air, no isolation and insurance information given ) ETA after midnight, CHRISTY Howell RN made aware.

## 2024-05-03 LAB
EKG ATRIAL RATE: 55 BPM
EKG DIAGNOSIS: NORMAL
EKG P AXIS: 1 DEGREES
EKG P-R INTERVAL: 162 MS
EKG Q-T INTERVAL: 468 MS
EKG QRS DURATION: 142 MS
EKG QTC CALCULATION (BAZETT): 447 MS
EKG R AXIS: 25 DEGREES
EKG T AXIS: 56 DEGREES
EKG VENTRICULAR RATE: 55 BPM

## 2024-11-27 ENCOUNTER — HOSPITAL ENCOUNTER (EMERGENCY)
Facility: HOSPITAL | Age: 74
Discharge: HOME | End: 2024-11-27
Attending: FAMILY MEDICINE | Admitting: FAMILY MEDICINE
Payer: MEDICARE

## 2024-11-27 VITALS
HEART RATE: 57 BPM | OXYGEN SATURATION: 100 % | SYSTOLIC BLOOD PRESSURE: 112 MMHG | BODY MASS INDEX: 25.85 KG/M2 | RESPIRATION RATE: 15 BRPM | TEMPERATURE: 97.7 F | WEIGHT: 145.9 LBS | DIASTOLIC BLOOD PRESSURE: 82 MMHG
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
## 2024-11-27 PROCEDURE — 99283 EMERGENCY DEPT VISIT LOW MDM: CPT

## 2024-11-27 ASSESSMENT — PAIN SCALES - PAIN ASSESSMENT IN ADVANCED DEMENTIA (PAINAD)
CONSOLABILITY: NO NEED TO CONSOLE
FACIALEXPRESSION: SMILING OR INEXPRESSIVE
BODYLANGUAGE: TENSE, DISTRESSED PACING, FIDGETING
BREATHING: NORMAL
BREATHING: NORMAL
FACIALEXPRESSION: SMILING OR INEXPRESSIVE
CONSOLABILITY: NO NEED TO CONSOLE
NEGVOCALIZATION: OCCASIONAL MOAN/GROAN, LOW SPEECH, NEGATIVE/DISAPPROVING QUALITY
TOTALSCORE: 2
BODYLANGUAGE: RELAXED
TOTALSCORE: 0

## 2024-11-27 ASSESSMENT — LIFESTYLE VARIABLES
HOW MANY STANDARD DRINKS CONTAINING ALCOHOL DO YOU HAVE ON A TYPICAL DAY: PATIENT UNABLE TO ANSWER
HOW OFTEN DO YOU HAVE A DRINK CONTAINING ALCOHOL: PATIENT UNABLE TO ANSWER

## 2024-11-27 ASSESSMENT — PAIN - FUNCTIONAL ASSESSMENT: PAIN_FUNCTIONAL_ASSESSMENT: PAIN ASSESSMENT IN ADVANCED DEMENTIA (PAINAD)

## 2024-11-28 NOTE — ED TRIAGE NOTES
Patient presents to the ED from [facility] after a fall. Staff states the patient hit her head so they wanted to have her checked out. Pt alert but disoriented, unsure if it is her baseline but does have a hx of dementia. Pt unable to follow commands, unable to verbalize or localize pain but withdraws.

## 2024-11-28 NOTE — ED NOTES
Pt incontinent of urine. Incontinence care provided and new brief applied. Pt tolerated without incidence.

## 2024-11-28 NOTE — PROGRESS NOTES
Contacted [facility] to arrange BLS transport back to [facility]. Spoke with Grace. Discussed patient condtion, and need for transport. Crew is on-site.

## 2024-11-28 NOTE — ED NOTES
Report called to OSEAS Gold at [facility]. Report given to Michelle Riddle [facility].

## 2024-11-28 NOTE — ED PROVIDER NOTES
[facility] EMERGENCY DEP  EMERGENCY DEPARTMENT ENCOUNTER       Pt Name: Antonina Perez  MRN: [id number]  Birthdate 1950  Date of evaluation: 11/27/2024  Provider: Ruth Glez MD   PCP: Karmen Fuchs APRN - NP  Note Started: 10:16 PM EST 11/27/24     CHIEF COMPLAINT       Chief Complaint   Patient presents with    Fall        HISTORY OF PRESENT ILLNESS: 1 or more elements      History From: Nursing Home/SNF/Rehab Center  HPI Limitations: None     Antonina Perez is a 74 y.o. female who was brought to the ED by EMS because of a ground level fall.      Nursing Notes were all reviewed and agreed with or any disagreements were addressed in the HPI.     REVIEW OF SYSTEMS      Review of Systems     Positives and Pertinent negatives as per HPI.    PAST HISTORY     Past Medical History:  Past Medical History:   Diagnosis Date    Atrial fibrillation (HCC)     Colon polyp     Depression     Diabetes (HCC)     Neuropathy    Hyperlipemia     Hypertension     Hypothyroidism     Morbid obesity with BMI of 40.0-44.9, adult          Past Surgical History:  Past Surgical History:   Procedure Laterality Date    APPENDECTOMY      GYN      Hysterectomy, BTL    HEENT      nasal  surgery    ORTHOPEDIC SURGERY      neck surgery, foot surgery, knee and shoulder surgery    [place] UNLISTED PROCEDURE ABDOMEN PERITONEUM & OMENTUM         Family History:  Family History   Problem Relation Age of Onset    Heart Disease Mother        Social History:  Social History     Tobacco Use    Smoking status: Never    Smokeless tobacco: Never   Substance Use Topics    Alcohol use: No    Drug use: No       Allergies:  Allergies   Allergen Reactions    Lisinopril Other (See Comments)     Pt unaware- listed in PCP notes    Tetanus Toxoids      Other reaction(s): Unknown (comments)       CURRENT MEDICATIONS      Previous Medications    ASPIRIN 81 MG CHEWABLE TABLET    Take 162 mg by mouth daily    ATORVASTATIN (LIPITOR) 40 MG TABLET    Take by mouth

## 2025-01-29 ENCOUNTER — HOSPITAL ENCOUNTER (EMERGENCY)
Facility: HOSPITAL | Age: 75
Discharge: NURSING, REHAB OR LONG-TERM CARE | End: 2025-01-29
Attending: EMERGENCY MEDICINE
Payer: MEDICARE

## 2025-01-29 ENCOUNTER — APPOINTMENT (OUTPATIENT)
Facility: HOSPITAL | Age: 75
End: 2025-01-29
Payer: MEDICARE

## 2025-01-29 VITALS
TEMPERATURE: 97.4 F | DIASTOLIC BLOOD PRESSURE: 60 MMHG | OXYGEN SATURATION: 96 % | BODY MASS INDEX: 26.05 KG/M2 | HEIGHT: 63 IN | SYSTOLIC BLOOD PRESSURE: 145 MMHG | WEIGHT: 147 LBS | RESPIRATION RATE: 15 BRPM | HEART RATE: 52 BPM

## 2025-01-29 DIAGNOSIS — S09.90XA INJURY OF HEAD, INITIAL ENCOUNTER: Primary | ICD-10-CM

## 2025-01-29 DIAGNOSIS — S00.83XA TRAUMATIC HEMATOMA OF FOREHEAD, INITIAL ENCOUNTER: ICD-10-CM

## 2025-01-29 PROCEDURE — 70450 CT HEAD/BRAIN W/O DYE: CPT

## 2025-01-29 PROCEDURE — 99284 EMERGENCY DEPT VISIT MOD MDM: CPT

## 2025-01-29 ASSESSMENT — PAIN SCALES - GENERAL
PAINLEVEL_OUTOF10: 0
PAINLEVEL_OUTOF10: 0

## 2025-01-29 ASSESSMENT — PAIN - FUNCTIONAL ASSESSMENT
PAIN_FUNCTIONAL_ASSESSMENT: 0-10
PAIN_FUNCTIONAL_ASSESSMENT: 0-10

## 2025-01-29 NOTE — ED NOTES
Called report to Enid at the [facility].  [facility] to arrive at [phone]

## 2025-01-29 NOTE — ED NOTES
I have reviewed discharge instructions with the caregiver. The caregiver verbalized understanding. Discharge medications discussed with patient. No questions at this time.  Reports called to Enid at the [facility]

## 2025-01-29 NOTE — ED TRIAGE NOTES
Pt arrived via EMS f rom [facility] with repot of an unwitnessed fall with hematoma to left temporal region and skin tear with scan bleeding to RFA. Pt confused  - normal mentation per EMS.

## 2025-01-29 NOTE — ED NOTES
Patient has been resting. Ready for discharge.  Report to Carlo Mcgowan Paramedic with [facility]

## 2025-01-29 NOTE — ED PROVIDER NOTES
[facility] EMERGENCY DEPARTMENT  EMERGENCY DEPARTMENT ENCOUNTER       Pt Name: Antonina Perez  MRN: [id number]  Birthdate 1950  Date of evaluation: 1/29/2025  Provider: Carlo Gannon DO   PCP: Karmen Fuchs APRN - NP  Note Started: 7:53 AM EST 1/29/25     CHIEF COMPLAINT       Chief Complaint   Patient presents with    Fall    Head Injury        HISTORY OF PRESENT ILLNESS: 1 or more elements      History From: Patient, History limited by: none     Antonina Perez is a 74 y.o. female presents to the emergency department by EMS for evaluation of closed head injury.       Please See MDM for Additional Details of the HPI/PMH  Nursing Notes were all reviewed and agreed with or any disagreements were addressed in the HPI.     REVIEW OF SYSTEMS        Positives and Pertinent negatives as per HPI.    PAST HISTORY     Past Medical History:  Past Medical History:   Diagnosis Date    Atrial fibrillation (HCC)     Colon polyp     Depression     Diabetes (HCC)     Neuropathy    Hyperlipemia     Hypertension     Hypothyroidism     Morbid obesity with BMI of 40.0-44.9, adult        Past Surgical History:  Past Surgical History:   Procedure Laterality Date    APPENDECTOMY      GYN      Hysterectomy, BTL    HEENT      nasal  surgery    ORTHOPEDIC SURGERY      neck surgery, foot surgery, knee and shoulder surgery    [place] UNLISTED PROCEDURE ABDOMEN PERITONEUM & OMENTUM         Family History:  Family History   Problem Relation Age of Onset    Heart Disease Mother        Social History:  Social History     Tobacco Use    Smoking status: Never     Passive exposure: Never    Smokeless tobacco: Never   Vaping Use    Vaping status: Never Used   Substance Use Topics    Alcohol use: No    Drug use: No       Allergies:  Allergies   Allergen Reactions    Lisinopril Other (See Comments)     Pt unaware- listed in PCP notes    Tetanus Toxoids      Other reaction(s): Unknown (comments)       CURRENT MEDICATIONS

## 2025-08-04 ENCOUNTER — APPOINTMENT (OUTPATIENT)
Facility: HOSPITAL | Age: 75
End: 2025-08-04
Payer: MEDICARE

## 2025-08-04 ENCOUNTER — HOSPITAL ENCOUNTER (EMERGENCY)
Facility: HOSPITAL | Age: 75
Discharge: HOME | End: 2025-08-04
Attending: EMERGENCY MEDICINE
Payer: MEDICARE

## 2025-08-04 VITALS
DIASTOLIC BLOOD PRESSURE: 46 MMHG | BODY MASS INDEX: 27.16 KG/M2 | SYSTOLIC BLOOD PRESSURE: 117 MMHG | HEIGHT: 63 IN | TEMPERATURE: 98.1 F | WEIGHT: 153.3 LBS | RESPIRATION RATE: 13 BRPM | OXYGEN SATURATION: 96 % | HEART RATE: 56 BPM

## 2025-08-04 DIAGNOSIS — S01.01XA LACERATION OF SCALP, INITIAL ENCOUNTER: ICD-10-CM
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
## 2025-08-04 PROCEDURE — 70450 CT HEAD/BRAIN W/O DYE: CPT

## 2025-08-04 PROCEDURE — 12002 RPR S/N/AX/GEN/TRNK2.6-7.5CM: CPT

## 2025-08-04 PROCEDURE — 99284 EMERGENCY DEPT VISIT MOD MDM: CPT

## 2025-08-04 PROCEDURE — 72125 CT NECK SPINE W/O DYE: CPT

## 2025-08-04 ASSESSMENT — PAIN SCALES - PAIN ASSESSMENT IN ADVANCED DEMENTIA (PAINAD)
FACIALEXPRESSION: SAD, FRIGHTENED, FROWN
BREATHING: NORMAL
CONSOLABILITY: DISTRACTED OR REASSURED BY VOICE/TOUCH
BODYLANGUAGE: TENSE, DISTRESSED PACING, FIDGETING
TOTALSCORE: 4
NEGVOCALIZATION: OCCASIONAL MOAN/GROAN, LOW SPEECH, NEGATIVE/DISAPPROVING QUALITY

## 2025-08-04 ASSESSMENT — PAIN - FUNCTIONAL ASSESSMENT: PAIN_FUNCTIONAL_ASSESSMENT: PAIN ASSESSMENT IN ADVANCED DEMENTIA (PAINAD)

## 2025-08-04 ASSESSMENT — LIFESTYLE VARIABLES
HOW OFTEN DO YOU HAVE A DRINK CONTAINING ALCOHOL: PATIENT UNABLE TO ANSWER
HOW MANY STANDARD DRINKS CONTAINING ALCOHOL DO YOU HAVE ON A TYPICAL DAY: PATIENT UNABLE TO ANSWER